# Patient Record
Sex: FEMALE | Race: WHITE | Employment: FULL TIME | URBAN - METROPOLITAN AREA
[De-identification: names, ages, dates, MRNs, and addresses within clinical notes are randomized per-mention and may not be internally consistent; named-entity substitution may affect disease eponyms.]

---

## 2017-01-19 ENCOUNTER — OFFICE VISIT (OUTPATIENT)
Dept: OTOLARYNGOLOGY | Facility: CLINIC | Age: 52
End: 2017-01-19

## 2017-01-19 VITALS
DIASTOLIC BLOOD PRESSURE: 76 MMHG | SYSTOLIC BLOOD PRESSURE: 108 MMHG | TEMPERATURE: 100 F | BODY MASS INDEX: 24.45 KG/M2 | HEIGHT: 63 IN | WEIGHT: 138 LBS

## 2017-01-19 DIAGNOSIS — J35.8 RETENTION CYST OF TONSIL: Primary | ICD-10-CM

## 2017-01-20 NOTE — PROGRESS NOTES
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 Patient presents with: Tonsil Problem      HISTORY OF PRESENT ILLNESS    She presents with a history of tonsil stone formation.  She recently saw developing some throat pain Stephon File on the left side and noted what irregular heartbeat/palpitations and syncope. GI Negative Abdominal pain and diarrhea. Endocrine Negative Cold intolerance and heat intolerance. Neuro Negative Tremors. Psych Negative Anxiety and depression.    Integumentary Negative Frequent skin i Inhalation Aerosol, Inhale 2 puffs into the lungs every 4 (four) hours as needed for Wheezing., Disp: 3 Inhaler, Rfl: 3  •  ergocalciferol 38637 UNITS Oral Cap, Take 1 capsule (50,000 Units total) by mouth once a week., Disp: 12 capsule, Rfl: 3  •  Citalop

## 2017-01-22 ENCOUNTER — TELEPHONE (OUTPATIENT)
Dept: INTERNAL MEDICINE CLINIC | Facility: CLINIC | Age: 52
End: 2017-01-22

## 2017-01-22 DIAGNOSIS — Z00.00 ROUTINE HEALTH MAINTENANCE: Primary | ICD-10-CM

## 2017-01-23 RX ORDER — TOPIRAMATE 25 MG/1
75 TABLET ORAL 2 TIMES DAILY
Qty: 180 TABLET | Refills: 0 | Status: SHIPPED | OUTPATIENT
Start: 2017-01-23 | End: 2017-06-08

## 2017-02-02 LAB
ABSOLUTE BASOPHILS: 19 CELLS/UL (ref 0–200)
ABSOLUTE EOSINOPHILS: 154 CELLS/UL (ref 15–500)
ABSOLUTE LYMPHOCYTES: 1773 CELLS/UL (ref 850–3900)
ABSOLUTE MONOCYTES: 435 CELLS/UL (ref 200–950)
ABSOLUTE NEUTROPHILS: 4019 CELLS/UL (ref 1500–7800)
BASOPHILS: 0.3 %
BUN: 13 MG/DL (ref 7–25)
CALCIUM: 9 MG/DL (ref 8.6–10.4)
CARBON DIOXIDE: 21 MMOL/L (ref 20–31)
CHLORIDE: 106 MMOL/L (ref 98–110)
CREATININE: 1.03 MG/DL (ref 0.5–1.05)
EGFR IF AFRICN AM: 72 ML/MIN/1.73M2
EGFR IF NONAFRICN AM: 62 ML/MIN/1.73M2
EOSINOPHILS: 2.4 %
GLUCOSE: 67 MG/DL (ref 65–99)
HEMATOCRIT: 43.1 % (ref 35–45)
HEMOGLOBIN: 14.3 G/DL (ref 11.7–15.5)
LYMPHOCYTES: 27.7 %
MCH: 28.2 PG (ref 27–33)
MCHC: 33.2 G/DL (ref 32–36)
MCV: 85.1 FL (ref 80–100)
MONOCYTES: 6.8 %
MPV: 9.1 FL (ref 7.5–12.5)
NEUTROPHILS: 62.8 %
PLATELET COUNT: 249 THOUSAND/UL (ref 140–400)
POTASSIUM: 3.8 MMOL/L (ref 3.5–5.3)
RDW: 12.9 % (ref 11–15)
RED BLOOD CELL COUNT: 5.06 MILLION/UL (ref 3.8–5.1)
SODIUM: 135 MMOL/L (ref 135–146)
T4, FREE: 1.4 NG/DL (ref 0.8–1.8)
TSH W/REFLEX TO FT4: 0.24 MIU/L
WHITE BLOOD CELL COUNT: 6.4 THOUSAND/UL (ref 3.8–10.8)

## 2017-02-15 PROCEDURE — 88175 CYTOPATH C/V AUTO FLUID REDO: CPT | Performed by: INTERNAL MEDICINE

## 2017-05-05 ENCOUNTER — TELEPHONE (OUTPATIENT)
Dept: INTERNAL MEDICINE CLINIC | Facility: CLINIC | Age: 52
End: 2017-05-05

## 2017-05-05 DIAGNOSIS — E03.9 ACQUIRED HYPOTHYROIDISM: Primary | ICD-10-CM

## 2017-06-08 ENCOUNTER — TELEPHONE (OUTPATIENT)
Dept: INTERNAL MEDICINE CLINIC | Facility: CLINIC | Age: 52
End: 2017-06-08

## 2017-06-08 RX ORDER — TOPIRAMATE 50 MG/1
50 TABLET, FILM COATED ORAL 2 TIMES DAILY
Qty: 180 TABLET | Refills: 3 | Status: SHIPPED | OUTPATIENT
Start: 2017-06-08 | End: 2017-10-07

## 2017-06-08 RX ORDER — LEVOTHYROXINE SODIUM 0.1 MG/1
100 TABLET ORAL DAILY
Qty: 90 TABLET | Refills: 3 | Status: SHIPPED | OUTPATIENT
Start: 2017-06-08 | End: 2017-10-07

## 2017-06-08 RX ORDER — TOPIRAMATE 25 MG/1
25 TABLET ORAL 2 TIMES DAILY
Qty: 180 TABLET | Refills: 3 | Status: SHIPPED | OUTPATIENT
Start: 2017-06-08 | End: 2017-09-04

## 2017-06-08 RX ORDER — ESTRADIOL 1 MG/1
1 TABLET ORAL DAILY
Qty: 90 TABLET | Refills: 3 | Status: SHIPPED | OUTPATIENT
Start: 2017-06-08 | End: 2017-11-13

## 2017-06-08 RX ORDER — METOPROLOL SUCCINATE 50 MG/1
50 TABLET, EXTENDED RELEASE ORAL DAILY
Qty: 90 TABLET | Refills: 3 | Status: SHIPPED | OUTPATIENT
Start: 2017-06-08 | End: 2017-08-14

## 2017-06-08 RX ORDER — MONTELUKAST SODIUM 10 MG/1
10 TABLET ORAL NIGHTLY
Qty: 90 TABLET | Refills: 3 | Status: SHIPPED | OUTPATIENT
Start: 2017-06-08 | End: 2017-08-14

## 2017-06-08 RX ORDER — MEDROXYPROGESTERONE ACETATE 5 MG/1
5 TABLET ORAL DAILY
Qty: 90 TABLET | Refills: 3 | Status: SHIPPED | OUTPATIENT
Start: 2017-06-08 | End: 2017-11-13

## 2017-06-08 RX ORDER — ERGOCALCIFEROL 1.25 MG/1
50000 CAPSULE ORAL WEEKLY
Qty: 12 CAPSULE | Refills: 3 | Status: SHIPPED | OUTPATIENT
Start: 2017-06-08 | End: 2017-09-04

## 2017-06-09 RX ORDER — CITALOPRAM 20 MG/1
TABLET ORAL
Qty: 90 TABLET | Refills: 3 | Status: SHIPPED | OUTPATIENT
Start: 2017-06-09 | End: 2017-12-06

## 2017-08-13 ENCOUNTER — TELEPHONE (OUTPATIENT)
Dept: INTERNAL MEDICINE CLINIC | Facility: CLINIC | Age: 52
End: 2017-08-13

## 2017-08-13 RX ORDER — RIZATRIPTAN BENZOATE 10 MG/1
10 TABLET ORAL AS NEEDED
Qty: 12 TABLET | Refills: 5 | Status: SHIPPED | OUTPATIENT
Start: 2017-08-13 | End: 2017-10-19

## 2017-08-13 RX ORDER — PREDNISOLONE ACETATE 10 MG/ML
2 SUSPENSION/ DROPS OPHTHALMIC 4 TIMES DAILY
Qty: 10 ML | Refills: 3 | Status: SHIPPED | OUTPATIENT
Start: 2017-08-13 | End: 2017-09-04

## 2017-08-14 RX ORDER — MONTELUKAST SODIUM 10 MG/1
10 TABLET ORAL NIGHTLY
Qty: 90 TABLET | Refills: 3 | Status: SHIPPED | OUTPATIENT
Start: 2017-08-14 | End: 2017-10-19

## 2017-08-14 RX ORDER — METOPROLOL SUCCINATE 50 MG/1
50 TABLET, EXTENDED RELEASE ORAL DAILY
Qty: 90 TABLET | Refills: 3 | Status: SHIPPED | OUTPATIENT
Start: 2017-08-14 | End: 2017-10-07

## 2017-08-14 RX ORDER — RANITIDINE 150 MG/1
150 TABLET ORAL 2 TIMES DAILY
Qty: 180 TABLET | Refills: 11 | Status: SHIPPED | OUTPATIENT
Start: 2017-08-14 | End: 2017-09-04

## 2017-09-06 ENCOUNTER — OFFICE VISIT (OUTPATIENT)
Dept: PODIATRY CLINIC | Facility: CLINIC | Age: 52
End: 2017-09-06

## 2017-09-06 VITALS — SYSTOLIC BLOOD PRESSURE: 116 MMHG | DIASTOLIC BLOOD PRESSURE: 64 MMHG | HEART RATE: 68 BPM | RESPIRATION RATE: 16 BRPM

## 2017-09-06 DIAGNOSIS — M20.5X1 HALLUX LIMITUS, RIGHT: Primary | ICD-10-CM

## 2017-09-06 PROCEDURE — 20600 DRAIN/INJ JOINT/BURSA W/O US: CPT | Performed by: PODIATRIST

## 2017-09-06 RX ORDER — METHYLPREDNISOLONE ACETATE 80 MG/ML
20 INJECTION, SUSPENSION INTRA-ARTICULAR; INTRALESIONAL; INTRAMUSCULAR; SOFT TISSUE ONCE
Status: COMPLETED | OUTPATIENT
Start: 2017-09-06 | End: 2017-09-06

## 2017-09-06 RX ADMIN — METHYLPREDNISOLONE ACETATE 20 MG: 80 INJECTION, SUSPENSION INTRA-ARTICULAR; INTRALESIONAL; INTRAMUSCULAR; SOFT TISSUE at 18:27:00

## 2017-09-06 NOTE — PROGRESS NOTES
Per verbal order from [facility], draw up 4ml of 1% lidocaine and 2ml of Kenalog 10 for cortisone injection to right foot big toe .  Modesta Montgomery

## 2017-09-06 NOTE — PROGRESS NOTES
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 HPI  This 79-year-old female presents for a desire to have a cortisone injection to the right great toe. I injected this approximately 1 year ago with relatively good but temporary relief. discussed reinjection of this joint. Patient signed a written consent. I injected the first metatarsal phalangeal joint of this right first metatarsophalangeal joint with Marcaine and Depo-Medrol 20 mg.   She was given postinjection instructions and will

## 2017-09-12 RX ORDER — METOPROLOL SUCCINATE 50 MG/1
50 TABLET, EXTENDED RELEASE ORAL DAILY
Qty: 90 TABLET | Refills: 3 | Status: SHIPPED | OUTPATIENT
Start: 2017-09-12 | End: 2018-08-24

## 2017-10-07 RX ORDER — LEVOTHYROXINE SODIUM 0.1 MG/1
100 TABLET ORAL DAILY
Qty: 90 TABLET | Refills: 3 | Status: SHIPPED | OUTPATIENT
Start: 2017-10-07 | End: 2017-10-19

## 2017-10-07 RX ORDER — RANITIDINE 150 MG/1
150 TABLET ORAL 2 TIMES DAILY
Qty: 180 TABLET | Refills: 11 | Status: SHIPPED | OUTPATIENT
Start: 2017-10-07 | End: 2017-11-13

## 2017-10-07 RX ORDER — TOPIRAMATE 50 MG/1
50 TABLET, FILM COATED ORAL 2 TIMES DAILY
Qty: 180 TABLET | Refills: 3 | Status: SHIPPED | OUTPATIENT
Start: 2017-10-07 | End: 2018-08-10

## 2017-10-11 ENCOUNTER — MED REC SCAN ONLY (OUTPATIENT)
Dept: INTERNAL MEDICINE CLINIC | Facility: CLINIC | Age: 52
End: 2017-10-11

## 2017-10-19 ENCOUNTER — TELEPHONE (OUTPATIENT)
Dept: INTERNAL MEDICINE CLINIC | Facility: CLINIC | Age: 52
End: 2017-10-19

## 2017-10-19 DIAGNOSIS — Z12.31 VISIT FOR SCREENING MAMMOGRAM: Primary | ICD-10-CM

## 2017-10-19 RX ORDER — TERBINAFINE HYDROCHLORIDE 250 MG/1
250 TABLET ORAL DAILY
Qty: 90 TABLET | Refills: 1 | Status: SHIPPED | OUTPATIENT
Start: 2017-10-19 | End: 2017-10-30

## 2017-10-19 RX ORDER — MONTELUKAST SODIUM 10 MG/1
10 TABLET ORAL NIGHTLY
Qty: 90 TABLET | Refills: 3 | Status: SHIPPED | OUTPATIENT
Start: 2017-10-19 | End: 2019-01-31

## 2017-10-19 RX ORDER — LEVOTHYROXINE SODIUM 0.1 MG/1
100 TABLET ORAL DAILY
Qty: 90 TABLET | Refills: 3 | Status: SHIPPED | OUTPATIENT
Start: 2017-10-19 | End: 2017-10-30

## 2017-10-19 RX ORDER — RIZATRIPTAN BENZOATE 10 MG/1
10 TABLET ORAL AS NEEDED
Qty: 12 TABLET | Refills: 5 | Status: SHIPPED | OUTPATIENT
Start: 2017-10-19 | End: 2018-09-25

## 2017-10-30 RX ORDER — LEVOTHYROXINE SODIUM 0.1 MG/1
100 TABLET ORAL DAILY
Qty: 90 TABLET | Refills: 3 | Status: SHIPPED | OUTPATIENT
Start: 2017-10-30 | End: 2018-08-04

## 2017-10-30 RX ORDER — TERBINAFINE HYDROCHLORIDE 250 MG/1
250 TABLET ORAL DAILY
Qty: 90 TABLET | Refills: 1 | Status: SHIPPED | OUTPATIENT
Start: 2017-10-30 | End: 2017-11-29

## 2017-11-04 RX ORDER — LEVALBUTEROL TARTRATE 45 UG/1
2 AEROSOL, METERED ORAL EVERY 4 HOURS PRN
Qty: 3 INHALER | Refills: 3 | Status: SHIPPED | OUTPATIENT
Start: 2017-11-04 | End: 2019-01-31

## 2017-11-13 RX ORDER — TOPIRAMATE 25 MG/1
25 TABLET ORAL 2 TIMES DAILY
Qty: 180 TABLET | Refills: 3 | Status: SHIPPED | OUTPATIENT
Start: 2017-11-13 | End: 2017-11-29

## 2017-11-13 RX ORDER — ESTRADIOL 1 MG/1
1 TABLET ORAL DAILY
Qty: 90 TABLET | Refills: 3 | Status: SHIPPED | OUTPATIENT
Start: 2017-11-13 | End: 2018-10-28

## 2017-11-13 RX ORDER — MEDROXYPROGESTERONE ACETATE 5 MG/1
5 TABLET ORAL DAILY
Qty: 90 TABLET | Refills: 3 | Status: SHIPPED | OUTPATIENT
Start: 2017-11-13 | End: 2018-10-28

## 2017-11-29 RX ORDER — CELECOXIB 200 MG/1
200 CAPSULE ORAL 2 TIMES DAILY
Qty: 60 CAPSULE | Refills: 1 | Status: SHIPPED | OUTPATIENT
Start: 2017-11-29 | End: 2017-12-05

## 2017-12-10 ENCOUNTER — TELEPHONE (OUTPATIENT)
Dept: INTERNAL MEDICINE CLINIC | Facility: CLINIC | Age: 52
End: 2017-12-10

## 2017-12-10 DIAGNOSIS — E03.9 HYPOTHYROIDISM, UNSPECIFIED TYPE: Primary | ICD-10-CM

## 2017-12-10 DIAGNOSIS — Z00.00 HEALTH CARE MAINTENANCE: ICD-10-CM

## 2017-12-13 LAB
ALBUMIN/GLOBULIN RATIO: 1.5 (CALC) (ref 1–2.5)
ALBUMIN: 4.1 G/DL (ref 3.6–5.1)
ALKALINE PHOSPHATASE: 65 U/L (ref 33–130)
ALT: 18 U/L (ref 6–29)
AST: 17 U/L (ref 10–35)
BILIRUBIN, TOTAL: 0.5 MG/DL (ref 0.2–1.2)
BUN: 17 MG/DL (ref 7–25)
CALCIUM: 8.8 MG/DL (ref 8.6–10.4)
CARBON DIOXIDE: 20 MMOL/L (ref 20–31)
CHLORIDE: 108 MMOL/L (ref 98–110)
CREATININE: 0.98 MG/DL (ref 0.5–1.05)
EGFR IF AFRICN AM: 76 ML/MIN/1.73M2
EGFR IF NONAFRICN AM: 66 ML/MIN/1.73M2
GLOBULIN: 2.7 G/DL (CALC) (ref 1.9–3.7)
GLUCOSE: 75 MG/DL (ref 65–99)
POTASSIUM: 4.1 MMOL/L (ref 3.5–5.3)
PROTEIN, TOTAL: 6.8 G/DL (ref 6.1–8.1)
SODIUM: 137 MMOL/L (ref 135–146)
TSH W/REFLEX TO FT4: 0.55 MIU/L
VITAMIN D, 25-OH, TOTAL: 46 NG/ML (ref 30–100)

## 2017-12-24 RX ORDER — CLOBETASOL PROPIONATE 0.5 MG/G
CREAM TOPICAL
Qty: 45 G | Refills: 0 | Status: SHIPPED | OUTPATIENT
Start: 2017-12-24 | End: 2017-12-25

## 2017-12-25 RX ORDER — CLOBETASOL PROPIONATE 0.5 MG/G
CREAM TOPICAL
Qty: 45 G | Refills: 0 | Status: SHIPPED | OUTPATIENT
Start: 2017-12-25 | End: 2018-05-23

## 2017-12-26 RX ORDER — CLOBETASOL PROPIONATE 0.5 MG/G
CREAM TOPICAL
Qty: 45 G | Refills: 0 | OUTPATIENT
Start: 2017-12-26

## 2017-12-26 NOTE — TELEPHONE ENCOUNTER
[address] [facility], [facility] [address] , [phone], [phone]   Medication Detail      Disp Refills Start End    Clobetasol Propionate 0.05 % External Cream 45 g 0 12/25/2017     Sig: Apply bid.     E

## 2018-01-20 ENCOUNTER — HOSPITAL ENCOUNTER (OUTPATIENT)
Dept: MAMMOGRAPHY | Age: 53
Discharge: HOME | End: 2018-01-20
Attending: INTERNAL MEDICINE
Payer: COMMERCIAL

## 2018-01-20 DIAGNOSIS — Z12.31 VISIT FOR SCREENING MAMMOGRAM: ICD-10-CM

## 2018-01-20 PROCEDURE — 77063 BREAST TOMOSYNTHESIS BI: CPT | Performed by: INTERNAL MEDICINE

## 2018-01-20 PROCEDURE — 77067 SCR MAMMO BI INCL CAD: CPT | Performed by: INTERNAL MEDICINE

## 2018-01-31 ENCOUNTER — TELEPHONE (OUTPATIENT)
Dept: INTERNAL MEDICINE CLINIC | Facility: CLINIC | Age: 53
End: 2018-01-31

## 2018-01-31 DIAGNOSIS — M25.511 ACUTE PAIN OF RIGHT SHOULDER: Primary | ICD-10-CM

## 2018-01-31 DIAGNOSIS — M54.9 UPPER BACK PAIN: ICD-10-CM

## 2018-01-31 NOTE — TELEPHONE ENCOUNTER
Talked to patient and she is having right shoulder pain and upper back pain  -will send to PT - will have her come in if pain increases or worsens.

## 2018-05-01 ENCOUNTER — TELEPHONE (OUTPATIENT)
Dept: INTERNAL MEDICINE CLINIC | Facility: CLINIC | Age: 53
End: 2018-05-01

## 2018-05-01 DIAGNOSIS — R05.9 COUGH: Primary | ICD-10-CM

## 2018-05-04 ENCOUNTER — HOSPITAL ENCOUNTER (OUTPATIENT)
Dept: RESPIRATORY THERAPY | Facility: HOSPITAL | Age: 53
Discharge: HOME | End: 2018-05-04
Attending: INTERNAL MEDICINE
Payer: COMMERCIAL

## 2018-05-04 DIAGNOSIS — R05.9 COUGH: ICD-10-CM

## 2018-05-04 PROCEDURE — 94010 BREATHING CAPACITY TEST: CPT | Performed by: INTERNAL MEDICINE

## 2018-05-04 NOTE — PROCEDURES
[facility]    Patient's Name Radha Patel MD MRN [id number]    1965 Pulmonologist Umair Jordan MD   Location [facility] PCP Selestino Riedel, MD     IMPRESSION:    The spirometry is Normal.    N

## 2018-05-16 ENCOUNTER — OFFICE VISIT (OUTPATIENT)
Dept: PODIATRY CLINIC | Facility: CLINIC | Age: 53
End: 2018-05-16

## 2018-05-16 VITALS — SYSTOLIC BLOOD PRESSURE: 98 MMHG | DIASTOLIC BLOOD PRESSURE: 67 MMHG | RESPIRATION RATE: 14 BRPM | HEART RATE: 65 BPM

## 2018-05-16 DIAGNOSIS — M20.5X1 HALLUX LIMITUS, RIGHT: Primary | ICD-10-CM

## 2018-05-16 PROCEDURE — 20600 DRAIN/INJ JOINT/BURSA W/O US: CPT | Performed by: PODIATRIST

## 2018-05-16 NOTE — PROGRESS NOTES
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 HPI  This 18-year-old female presents with recurrent pain associated with the right great toe.   Saw this patient last September and gave her an injection of cortisone in the first metatarsoph Known Allergies   PHYSICAL EXAM:   Physical Exam  Physical exam pedal pulses are noted. There is no evidence of edema nor erythema. There is palpable discomfort at the first metatarsal phalangeal joint with limited motion.   Clearly the changes are consis

## 2018-05-16 NOTE — PROGRESS NOTES
Per verbal order from Dr. Maria Ines Harding, draw up 0.5ml of 0.5% Marcaine and 20 mg of Depo-Medrol for cortisone injection to right big toe. Modesta Montgomery    Patient left before post injection vitals were taken. Modesta Montgomery

## 2018-05-19 ENCOUNTER — HOSPITAL ENCOUNTER (OUTPATIENT)
Dept: GENERAL RADIOLOGY | Age: 53
Discharge: HOME | End: 2018-05-19
Attending: INTERNAL MEDICINE
Payer: COMMERCIAL

## 2018-05-19 ENCOUNTER — TELEPHONE (OUTPATIENT)
Dept: INTERNAL MEDICINE CLINIC | Facility: CLINIC | Age: 53
End: 2018-05-19

## 2018-05-19 DIAGNOSIS — R05.9 COUGH: ICD-10-CM

## 2018-05-19 DIAGNOSIS — R05.9 COUGH: Primary | ICD-10-CM

## 2018-05-19 PROCEDURE — 71046 X-RAY EXAM CHEST 2 VIEWS: CPT | Performed by: INTERNAL MEDICINE

## 2018-07-01 ENCOUNTER — TELEPHONE (OUTPATIENT)
Dept: INTERNAL MEDICINE CLINIC | Facility: CLINIC | Age: 53
End: 2018-07-01

## 2018-07-01 RX ORDER — TOPIRAMATE 25 MG/1
25 TABLET ORAL 2 TIMES DAILY
Qty: 180 TABLET | Refills: 3 | Status: SHIPPED | OUTPATIENT
Start: 2018-07-01 | End: 2019-01-31

## 2018-08-04 RX ORDER — LEVOTHYROXINE SODIUM 0.1 MG/1
TABLET ORAL
Qty: 90 TABLET | Refills: 0 | Status: SHIPPED | OUTPATIENT
Start: 2018-08-04 | End: 2019-01-31

## 2018-08-04 NOTE — TELEPHONE ENCOUNTER
Refill passed per [facility] protocol.   Hypothyroid Medications  Protocol Criteria:  Appointment scheduled in the past 12 months or the next 3 months  TSH resulted in the past 12 months that is normal  Recent Outpatient Visits            2 months ago

## 2018-08-10 RX ORDER — TOPIRAMATE 50 MG/1
50 TABLET, FILM COATED ORAL 2 TIMES DAILY
Qty: 180 TABLET | Refills: 3 | Status: SHIPPED | OUTPATIENT
Start: 2018-08-10 | End: 2019-06-30

## 2018-09-20 ENCOUNTER — MED REC SCAN ONLY (OUTPATIENT)
Dept: INTERNAL MEDICINE CLINIC | Facility: CLINIC | Age: 53
End: 2018-09-20

## 2018-09-20 ENCOUNTER — TELEPHONE (OUTPATIENT)
Dept: INTERNAL MEDICINE CLINIC | Facility: CLINIC | Age: 53
End: 2018-09-20

## 2018-09-20 DIAGNOSIS — Z23 NEED FOR VACCINATION: Primary | ICD-10-CM

## 2018-09-20 PROCEDURE — 90686 IIV4 VACC NO PRSV 0.5 ML IM: CPT | Performed by: INTERNAL MEDICINE

## 2018-09-20 PROCEDURE — 90471 IMMUNIZATION ADMIN: CPT | Performed by: INTERNAL MEDICINE

## 2018-09-25 ENCOUNTER — TELEPHONE (OUTPATIENT)
Dept: INTERNAL MEDICINE CLINIC | Facility: CLINIC | Age: 53
End: 2018-09-25

## 2018-09-25 RX ORDER — FLUCONAZOLE 100 MG/1
100 TABLET ORAL DAILY
Qty: 30 TABLET | Refills: 0 | Status: SHIPPED | OUTPATIENT
Start: 2018-09-25 | End: 2018-10-25

## 2018-09-25 RX ORDER — RIZATRIPTAN BENZOATE 10 MG/1
10 TABLET ORAL AS NEEDED
Qty: 12 TABLET | Refills: 5 | Status: SHIPPED | OUTPATIENT
Start: 2018-09-25 | End: 2019-05-21

## 2018-09-25 NOTE — TELEPHONE ENCOUNTER
Nathalia calling to advise a drug interaction with meds below. •  fluconazole 100 MG Oral Tab, Take 1 tablet (100 mg total) by mouth daily. , Disp: 30 tablet, Rfl: 0  •  Citalopram Hydrobromide 20 MG Oral Tab, Take 1 tablet (20 mg total) by mout

## 2018-09-25 NOTE — TELEPHONE ENCOUNTER
Spoke with [facility]--states drug interaction of two medications below is QT prolongation--asking if ok to fill.

## 2018-10-28 RX ORDER — ESTRADIOL 1 MG/1
1 TABLET ORAL DAILY
Qty: 90 TABLET | Refills: 3 | Status: SHIPPED | OUTPATIENT
Start: 2018-10-28 | End: 2019-09-25

## 2018-10-28 RX ORDER — MEDROXYPROGESTERONE ACETATE 5 MG/1
5 TABLET ORAL DAILY
Qty: 90 TABLET | Refills: 3 | Status: SHIPPED | OUTPATIENT
Start: 2018-10-28 | End: 2019-09-25

## 2018-11-07 ENCOUNTER — OFFICE VISIT (OUTPATIENT)
Dept: PODIATRY CLINIC | Facility: CLINIC | Age: 53
End: 2018-11-07
Payer: COMMERCIAL

## 2018-11-07 VITALS — SYSTOLIC BLOOD PRESSURE: 110 MMHG | TEMPERATURE: 97 F | DIASTOLIC BLOOD PRESSURE: 77 MMHG

## 2018-11-07 DIAGNOSIS — M20.5X1 HALLUX LIMITUS, RIGHT: Primary | ICD-10-CM

## 2018-11-07 PROCEDURE — 20600 DRAIN/INJ JOINT/BURSA W/O US: CPT | Performed by: PODIATRIST

## 2018-11-07 RX ORDER — ACETAMINOPHEN AND CODEINE PHOSPHATE 300; 30 MG/1; MG/1
TABLET ORAL
Refills: 0 | COMMUNITY
Start: 2018-10-24 | End: 2019-01-02

## 2018-11-07 NOTE — PROGRESS NOTES
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 This 50-year-old female presents with recurrent pain associated with the first metatarsophalangeal joint of the right foot. This pain has been relieved by previous cortisone injection.   Elsie Known Allergies   PHYSICAL EXAM:     On physical exam pedal pulses are noted there is no edema nor erythema. There is palpable discomfort with some limitation of motion of the MPJ there is no palpable crepitation.   I agreed to reinject this joint and afte

## 2018-12-22 ENCOUNTER — TELEPHONE (OUTPATIENT)
Dept: INTERNAL MEDICINE CLINIC | Facility: CLINIC | Age: 53
End: 2018-12-22

## 2018-12-22 RX ORDER — PANTOPRAZOLE SODIUM 40 MG/1
40 TABLET, DELAYED RELEASE ORAL
Qty: 90 TABLET | Refills: 3 | Status: SHIPPED | OUTPATIENT
Start: 2018-12-22 | End: 2019-01-31

## 2018-12-22 RX ORDER — CELECOXIB 200 MG/1
200 CAPSULE ORAL 2 TIMES DAILY
Qty: 180 CAPSULE | Refills: 3 | Status: SHIPPED | OUTPATIENT
Start: 2018-12-22 | End: 2019-01-31

## 2018-12-28 RX ORDER — TERBINAFINE HYDROCHLORIDE 250 MG/1
250 TABLET ORAL DAILY
Qty: 90 TABLET | Refills: 1 | Status: SHIPPED | OUTPATIENT
Start: 2018-12-28 | End: 2019-01-31

## 2019-01-04 ENCOUNTER — TELEPHONE (OUTPATIENT)
Dept: INTERNAL MEDICINE CLINIC | Facility: CLINIC | Age: 54
End: 2019-01-04

## 2019-01-04 DIAGNOSIS — Z00.00 HEALTHCARE MAINTENANCE: Primary | ICD-10-CM

## 2019-01-08 LAB
ABSOLUTE BASOPHILS: 39 CELLS/UL (ref 0–200)
ABSOLUTE EOSINOPHILS: 253 CELLS/UL (ref 15–500)
ABSOLUTE LYMPHOCYTES: 1727 CELLS/UL (ref 850–3900)
ABSOLUTE MONOCYTES: 363 CELLS/UL (ref 200–950)
ABSOLUTE NEUTROPHILS: 3119 CELLS/UL (ref 1500–7800)
ALBUMIN/GLOBULIN RATIO: 1.5 (CALC) (ref 1–2.5)
ALBUMIN: 4 G/DL (ref 3.6–5.1)
ALKALINE PHOSPHATASE: 56 U/L (ref 33–130)
ALT: 11 U/L (ref 6–29)
AST: 16 U/L (ref 10–35)
BASOPHILS: 0.7 %
BILIRUBIN, TOTAL: 0.7 MG/DL (ref 0.2–1.2)
BUN: 15 MG/DL (ref 7–25)
CALCIUM: 9 MG/DL (ref 8.6–10.4)
CARBON DIOXIDE: 26 MMOL/L (ref 20–32)
CHLORIDE: 107 MMOL/L (ref 98–110)
CHOL/HDLC RATIO: 2.6 (CALC)
CHOLESTEROL, TOTAL: 185 MG/DL
CREATININE: 0.91 MG/DL (ref 0.5–1.05)
EGFR IF AFRICN AM: 83 ML/MIN/1.73M2
EGFR IF NONAFRICN AM: 72 ML/MIN/1.73M2
EOSINOPHILS: 4.6 %
GLOBULIN: 2.7 G/DL (CALC) (ref 1.9–3.7)
GLUCOSE: 80 MG/DL (ref 65–99)
HDL CHOLESTEROL: 70 MG/DL
HEMATOCRIT: 39 % (ref 35–45)
HEMOGLOBIN: 13.1 G/DL (ref 11.7–15.5)
LDL-CHOLESTEROL: 98 MG/DL (CALC)
LYMPHOCYTES: 31.4 %
MCH: 28.5 PG (ref 27–33)
MCHC: 33.6 G/DL (ref 32–36)
MCV: 84.8 FL (ref 80–100)
MONOCYTES: 6.6 %
MPV: 10.8 FL (ref 7.5–12.5)
NEUTROPHILS: 56.7 %
NON-HDL CHOLESTEROL: 115 MG/DL (CALC)
PLATELET COUNT: 230 THOUSAND/UL (ref 140–400)
POTASSIUM: 4 MMOL/L (ref 3.5–5.3)
PROTEIN, TOTAL: 6.7 G/DL (ref 6.1–8.1)
RDW: 12.3 % (ref 11–15)
RED BLOOD CELL COUNT: 4.6 MILLION/UL (ref 3.8–5.1)
SODIUM: 138 MMOL/L (ref 135–146)
T4, FREE: 1.6 NG/DL (ref 0.8–1.8)
TRIGLYCERIDES: 77 MG/DL
TSH W/REFLEX TO FT4: 0.17 MIU/L
WHITE BLOOD CELL COUNT: 5.5 THOUSAND/UL (ref 3.8–10.8)

## 2019-01-31 ENCOUNTER — TELEPHONE (OUTPATIENT)
Dept: INTERNAL MEDICINE CLINIC | Facility: CLINIC | Age: 54
End: 2019-01-31

## 2019-01-31 RX ORDER — LEVOTHYROXINE SODIUM 88 UG/1
TABLET ORAL
Qty: 90 TABLET | Refills: 3 | Status: SHIPPED | OUTPATIENT
Start: 2019-01-31 | End: 2019-10-26

## 2019-02-15 ENCOUNTER — TELEPHONE (OUTPATIENT)
Dept: INTERNAL MEDICINE CLINIC | Facility: CLINIC | Age: 54
End: 2019-02-15

## 2019-02-15 DIAGNOSIS — Z00.00 HEALTHCARE MAINTENANCE: ICD-10-CM

## 2019-02-15 DIAGNOSIS — M85.89 OSTEOPENIA OF MULTIPLE SITES: Primary | ICD-10-CM

## 2019-02-15 RX ORDER — TRIAMCINOLONE ACETONIDE 0.25 MG/G
1 OINTMENT TOPICAL 3 TIMES DAILY
Qty: 80 G | Refills: 1 | Status: SHIPPED | OUTPATIENT
Start: 2019-02-15 | End: 2019-05-21

## 2019-02-22 ENCOUNTER — HOSPITAL ENCOUNTER (OUTPATIENT)
Dept: BONE DENSITY | Age: 54
Discharge: HOME | End: 2019-02-22
Attending: INTERNAL MEDICINE
Payer: COMMERCIAL

## 2019-02-22 ENCOUNTER — HOSPITAL ENCOUNTER (OUTPATIENT)
Dept: MAMMOGRAPHY | Age: 54
Discharge: HOME | End: 2019-02-22
Attending: INTERNAL MEDICINE
Payer: COMMERCIAL

## 2019-02-22 DIAGNOSIS — Z12.31 VISIT FOR SCREENING MAMMOGRAM: ICD-10-CM

## 2019-02-22 DIAGNOSIS — Z00.00 HEALTHCARE MAINTENANCE: ICD-10-CM

## 2019-02-22 DIAGNOSIS — M85.89 OSTEOPENIA OF MULTIPLE SITES: ICD-10-CM

## 2019-02-22 PROCEDURE — 77080 DXA BONE DENSITY AXIAL: CPT | Performed by: INTERNAL MEDICINE

## 2019-02-22 PROCEDURE — 77067 SCR MAMMO BI INCL CAD: CPT | Performed by: INTERNAL MEDICINE

## 2019-02-22 PROCEDURE — 77063 BREAST TOMOSYNTHESIS BI: CPT | Performed by: INTERNAL MEDICINE

## 2019-03-13 ENCOUNTER — OFFICE VISIT (OUTPATIENT)
Dept: PODIATRY CLINIC | Facility: CLINIC | Age: 54
End: 2019-03-13
Payer: COMMERCIAL

## 2019-03-13 DIAGNOSIS — M20.5X1 HALLUX LIMITUS, RIGHT: Primary | ICD-10-CM

## 2019-03-13 PROCEDURE — 20600 DRAIN/INJ JOINT/BURSA W/O US: CPT | Performed by: PODIATRIST

## 2019-03-13 RX ORDER — METHYLPREDNISOLONE ACETATE 80 MG/ML
80 INJECTION, SUSPENSION INTRA-ARTICULAR; INTRALESIONAL; INTRAMUSCULAR; SOFT TISSUE ONCE
Status: DISCONTINUED | OUTPATIENT
Start: 2019-03-13 | End: 2019-05-21

## 2019-03-13 NOTE — PROGRESS NOTES
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 62-year-old female presents with recurrent pain associated with the great toe. She has a vacation planned in the coming week and asked that I reinject the joint.   When I inject this katie given postinjection instructions.   She is well-informed and will follow-up as needed         ASSESSMENT/PLAN:   Hallux limitus, right  (primary encounter diagnosis)    Orders Placed This Encounter      arthrocentesis small joing      Meds This Visit:  Requ

## 2019-04-05 ENCOUNTER — HOSPITAL ENCOUNTER (OUTPATIENT)
Facility: HOSPITAL | Age: 54
Setting detail: HOSPITAL OUTPATIENT SURGERY
Discharge: HOME | End: 2019-04-05
Attending: INTERNAL MEDICINE | Admitting: INTERNAL MEDICINE
Payer: COMMERCIAL

## 2019-04-05 DIAGNOSIS — Z12.11 SPECIAL SCREENING FOR MALIGNANT NEOPLASMS, COLON: ICD-10-CM

## 2019-04-05 DIAGNOSIS — Z12.11 COLON CANCER SCREENING: ICD-10-CM

## 2019-04-05 PROCEDURE — 0DJD8ZZ INSPECTION OF LOWER INTESTINAL TRACT, VIA NATURAL OR ARTIFICIAL OPENING ENDOSCOPIC: ICD-10-PCS | Performed by: INTERNAL MEDICINE

## 2019-04-05 PROCEDURE — 99152 MOD SED SAME PHYS/QHP 5/>YRS: CPT | Performed by: INTERNAL MEDICINE

## 2019-04-05 RX ORDER — SODIUM CHLORIDE 0.9 % (FLUSH) 0.9 %
10 SYRINGE (ML) INJECTION AS NEEDED
Status: DISCONTINUED | OUTPATIENT
Start: 2019-04-05 | End: 2019-04-05

## 2019-04-05 RX ORDER — MIDAZOLAM HYDROCHLORIDE 1 MG/ML
INJECTION INTRAMUSCULAR; INTRAVENOUS
Status: DISCONTINUED | OUTPATIENT
Start: 2019-04-05 | End: 2019-04-05

## 2019-04-05 RX ORDER — MIDAZOLAM HYDROCHLORIDE 1 MG/ML
1 INJECTION INTRAMUSCULAR; INTRAVENOUS EVERY 5 MIN PRN
Status: DISCONTINUED | OUTPATIENT
Start: 2019-04-05 | End: 2019-04-05

## 2019-04-05 RX ORDER — SODIUM CHLORIDE, SODIUM LACTATE, POTASSIUM CHLORIDE, CALCIUM CHLORIDE 600; 310; 30; 20 MG/100ML; MG/100ML; MG/100ML; MG/100ML
INJECTION, SOLUTION INTRAVENOUS CONTINUOUS
Status: CANCELLED | OUTPATIENT
Start: 2019-04-05

## 2019-04-05 RX ORDER — SODIUM CHLORIDE 0.9 % (FLUSH) 0.9 %
10 SYRINGE (ML) INJECTION AS NEEDED
Status: CANCELLED | OUTPATIENT
Start: 2019-04-05

## 2019-04-05 RX ORDER — SODIUM CHLORIDE, SODIUM LACTATE, POTASSIUM CHLORIDE, CALCIUM CHLORIDE 600; 310; 30; 20 MG/100ML; MG/100ML; MG/100ML; MG/100ML
INJECTION, SOLUTION INTRAVENOUS CONTINUOUS
Status: DISCONTINUED | OUTPATIENT
Start: 2019-04-05 | End: 2019-04-05

## 2019-04-05 NOTE — OPERATIVE REPORT
COLONOSCOPY REPORT    Patient Name:  Fei Poon Record #: [id number]  YOB: 1965  Date of Procedure: 4/5/2019    Referring physician: Juan F Cueto MD    Surgeon:  Razia Urbina.  Wade Marx MD    Pre-op diagnosis: Colon canc

## 2019-04-05 NOTE — H&P
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 7/7/1965. Patient presents for a colonoscopy. ALLERGIES: No Known Allergies      No current outpatient medications on file.   Past Medical History:   Diagnosis Date   • Anxie

## 2019-04-06 VITALS
RESPIRATION RATE: 12 BRPM | HEIGHT: 63 IN | OXYGEN SATURATION: 100 % | WEIGHT: 127 LBS | SYSTOLIC BLOOD PRESSURE: 111 MMHG | DIASTOLIC BLOOD PRESSURE: 74 MMHG | HEART RATE: 76 BPM | BODY MASS INDEX: 22.5 KG/M2

## 2019-05-21 ENCOUNTER — TELEPHONE (OUTPATIENT)
Dept: INTERNAL MEDICINE CLINIC | Facility: CLINIC | Age: 54
End: 2019-05-21

## 2019-05-21 DIAGNOSIS — E03.9 HYPOTHYROIDISM, UNSPECIFIED TYPE: Primary | ICD-10-CM

## 2019-05-21 RX ORDER — RIZATRIPTAN BENZOATE 10 MG/1
10 TABLET ORAL AS NEEDED
Qty: 12 TABLET | Refills: 5 | Status: SHIPPED | OUTPATIENT
Start: 2019-05-21 | End: 2019-08-01

## 2019-05-31 ENCOUNTER — MOBILE ENCOUNTER (OUTPATIENT)
Dept: INTERNAL MEDICINE CLINIC | Facility: CLINIC | Age: 54
End: 2019-05-31

## 2019-06-11 ENCOUNTER — TELEPHONE (OUTPATIENT)
Dept: INTERNAL MEDICINE CLINIC | Facility: CLINIC | Age: 54
End: 2019-06-11

## 2019-06-11 ENCOUNTER — HOSPITAL ENCOUNTER (OUTPATIENT)
Dept: GENERAL RADIOLOGY | Facility: HOSPITAL | Age: 54
Discharge: HOME | End: 2019-06-11
Attending: INTERNAL MEDICINE
Payer: COMMERCIAL

## 2019-06-11 DIAGNOSIS — E03.9 ACQUIRED HYPOTHYROIDISM: ICD-10-CM

## 2019-06-11 DIAGNOSIS — M79.671 RIGHT FOOT PAIN: ICD-10-CM

## 2019-06-11 DIAGNOSIS — M79.671 RIGHT FOOT PAIN: Primary | ICD-10-CM

## 2019-06-11 PROCEDURE — 73630 X-RAY EXAM OF FOOT: CPT | Performed by: INTERNAL MEDICINE

## 2019-06-17 ENCOUNTER — OFFICE VISIT (OUTPATIENT)
Dept: PODIATRY CLINIC | Facility: CLINIC | Age: 54
End: 2019-06-17
Payer: COMMERCIAL

## 2019-06-17 VITALS — SYSTOLIC BLOOD PRESSURE: 106 MMHG | DIASTOLIC BLOOD PRESSURE: 69 MMHG | RESPIRATION RATE: 16 BRPM | HEART RATE: 81 BPM

## 2019-06-17 DIAGNOSIS — M20.5X1 HALLUX LIMITUS, RIGHT: Primary | ICD-10-CM

## 2019-06-17 DIAGNOSIS — B35.1 ONYCHOMYCOSIS: ICD-10-CM

## 2019-06-17 DIAGNOSIS — M77.50 CAPSULITIS OF METATARSOPHALANGEAL (MTP) JOINT: ICD-10-CM

## 2019-06-17 NOTE — PROGRESS NOTES
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 Patient presents with:   Foot Pain: Right - was seen by [facility] on 3/13/19 when she had a cortisone inj in R 1st toe - pain restarted about 2 mo after and now she has pain under her R 2nd toe also - rates pain as 3-5 5/28/2014   • Irritable bowel syndrome with diarrhea 2/3/2016   • Migraines 5/28/2014   • Osteopenia 5/28/2014      Past Surgical History:   Procedure Laterality Date   • COLONOSCOPY  02/2009    normal, done at [address] [place] [address]   • COLONOSCOPY  04/2019    normal   • COL especially the third and the right are thickened yellowed and dystrophic with subungual debris and distal lysis from the nailbed she indicates that she is tried a couple of courses of Lamisil tablet therapy as well as topicals with no success.   A biopsy fo understanding of these issues and agrees to the plan. Return in about 2 weeks (around 7/1/2019).     Marcos Daniel DPM  6/17/2019

## 2019-07-01 RX ORDER — TOPIRAMATE 50 MG/1
TABLET, FILM COATED ORAL
Qty: 180 TABLET | Refills: 3 | Status: SHIPPED | OUTPATIENT
Start: 2019-07-01 | End: 2020-03-04

## 2019-07-08 ENCOUNTER — OFFICE VISIT (OUTPATIENT)
Dept: PODIATRY CLINIC | Facility: CLINIC | Age: 54
End: 2019-07-08
Payer: COMMERCIAL

## 2019-07-08 VITALS — DIASTOLIC BLOOD PRESSURE: 75 MMHG | SYSTOLIC BLOOD PRESSURE: 109 MMHG | RESPIRATION RATE: 14 BRPM | HEART RATE: 81 BPM

## 2019-07-08 DIAGNOSIS — M20.5X1 HALLUX LIMITUS, RIGHT: Primary | ICD-10-CM

## 2019-07-08 DIAGNOSIS — M77.50 CAPSULITIS OF METATARSOPHALANGEAL (MTP) JOINT: ICD-10-CM

## 2019-07-08 PROCEDURE — 20600 DRAIN/INJ JOINT/BURSA W/O US: CPT | Performed by: PODIATRIST

## 2019-07-08 RX ORDER — TRIAMCINOLONE ACETONIDE 40 MG/ML
20 INJECTION, SUSPENSION INTRA-ARTICULAR; INTRAMUSCULAR ONCE
Status: COMPLETED | OUTPATIENT
Start: 2019-07-08 | End: 2019-07-08

## 2019-07-08 RX ORDER — ACETAMINOPHEN AND CODEINE PHOSPHATE 300; 30 MG/1; MG/1
TABLET ORAL
Refills: 1 | COMMUNITY
Start: 2019-06-16 | End: 2019-09-25

## 2019-07-08 RX ADMIN — TRIAMCINOLONE ACETONIDE 20 MG: 40 INJECTION, SUSPENSION INTRA-ARTICULAR; INTRAMUSCULAR at 09:14:00

## 2019-07-08 NOTE — PROGRESS NOTES
Per Dr Maggi Mantilla, draw up two syringes 0.5mL of 0.5% Marcaine and 0.5mL of Kenalog 40 for injection to right foot.  KP

## 2019-07-16 ENCOUNTER — TELEPHONE (OUTPATIENT)
Dept: ORTHOPEDICS CLINIC | Facility: CLINIC | Age: 54
End: 2019-07-16

## 2019-07-16 NOTE — TELEPHONE ENCOUNTER
S/w pt. appt made for 0920. Pt is requesting injection, states it was clear in message to [facility]. Patient repeated back time and location of appt, verbalized understanding of plan.

## 2019-07-17 ENCOUNTER — OFFICE VISIT (OUTPATIENT)
Dept: PODIATRY CLINIC | Facility: CLINIC | Age: 54
End: 2019-07-17
Payer: COMMERCIAL

## 2019-07-17 VITALS — DIASTOLIC BLOOD PRESSURE: 81 MMHG | SYSTOLIC BLOOD PRESSURE: 123 MMHG | HEART RATE: 81 BPM

## 2019-07-17 DIAGNOSIS — M20.5X1 HALLUX LIMITUS, RIGHT: Primary | ICD-10-CM

## 2019-07-17 PROCEDURE — 20600 DRAIN/INJ JOINT/BURSA W/O US: CPT | Performed by: PODIATRIST

## 2019-07-17 NOTE — PROGRESS NOTES
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 This 69-year-old female presents for follow-up and continued pain associated with the first metatarsophalangeal joint of the right foot.   Apparently she had a cortisone injection just over 1 no longer is working as it did in the past based on progressive bone-on-bone deformity. There is no edema nor erythema there is no active nor exudative draining there is no sign of infection.   After discussion I agreed to reinject and she signed a written

## 2019-07-17 NOTE — PROGRESS NOTES
Per verbal order from Dr. Sheridan Richards, draw up 0.5ml of 0.5% Marcaine and 10 mg of Depo-Medrol for cortisone injection to right Great toe.  Naif Harris RN

## 2019-08-01 ENCOUNTER — TELEPHONE (OUTPATIENT)
Dept: INTERNAL MEDICINE CLINIC | Facility: CLINIC | Age: 54
End: 2019-08-01

## 2019-08-01 RX ORDER — RIZATRIPTAN BENZOATE 10 MG/1
10 TABLET ORAL AS NEEDED
Qty: 12 TABLET | Refills: 5 | Status: SHIPPED | OUTPATIENT
Start: 2019-08-01 | End: 2019-12-14

## 2019-08-26 ENCOUNTER — TELEPHONE (OUTPATIENT)
Dept: INTERNAL MEDICINE CLINIC | Facility: CLINIC | Age: 54
End: 2019-08-26

## 2019-08-26 ENCOUNTER — APPOINTMENT (OUTPATIENT)
Dept: LAB | Age: 54
End: 2019-08-26
Attending: INTERNAL MEDICINE
Payer: COMMERCIAL

## 2019-08-26 DIAGNOSIS — M79.605 PAIN OF LEFT LOWER EXTREMITY: Primary | ICD-10-CM

## 2019-08-26 LAB — D DIMER PPP FEU-MCNC: <0.27 UG/ML FEU (ref ?–0.55)

## 2019-08-26 PROCEDURE — 85379 FIBRIN DEGRADATION QUANT: CPT | Performed by: INTERNAL MEDICINE

## 2019-08-26 PROCEDURE — 36415 COLL VENOUS BLD VENIPUNCTURE: CPT | Performed by: INTERNAL MEDICINE

## 2019-09-04 ENCOUNTER — HOSPITAL ENCOUNTER (OUTPATIENT)
Dept: GENERAL RADIOLOGY | Facility: HOSPITAL | Age: 54
Discharge: HOME | End: 2019-09-04
Attending: INTERNAL MEDICINE
Payer: COMMERCIAL

## 2019-09-04 DIAGNOSIS — M79.605 PAIN OF LEFT LOWER EXTREMITY: ICD-10-CM

## 2019-09-04 PROCEDURE — 73590 X-RAY EXAM OF LOWER LEG: CPT | Performed by: INTERNAL MEDICINE

## 2019-09-18 ENCOUNTER — TELEPHONE (OUTPATIENT)
Dept: INTERNAL MEDICINE CLINIC | Facility: CLINIC | Age: 54
End: 2019-09-18

## 2019-09-18 RX ORDER — ESTRADIOL 0.1 MG/G
CREAM VAGINAL
Qty: 1 TUBE | Refills: 0 | Status: SHIPPED | OUTPATIENT
Start: 2019-09-18 | End: 2019-09-25

## 2019-09-20 ENCOUNTER — TELEPHONE (OUTPATIENT)
Dept: INTERNAL MEDICINE CLINIC | Facility: CLINIC | Age: 54
End: 2019-09-20

## 2019-09-24 NOTE — TELEPHONE ENCOUNTER
PA for Estradiol 0.1MG/GM cream completed with [employer] via CMM response time 3-5 business days KEY [id number].

## 2019-09-25 RX ORDER — LEVALBUTEROL TARTRATE 45 UG/1
2 AEROSOL, METERED ORAL EVERY 4 HOURS PRN
Qty: 3 INHALER | Refills: 3 | Status: SHIPPED | OUTPATIENT
Start: 2019-09-25 | End: 2020-01-19

## 2019-10-02 NOTE — TELEPHONE ENCOUNTER
Received response from insurance company stating No PA is needed for medication. Pharmacy was aware of message.

## 2019-10-06 ENCOUNTER — TELEPHONE (OUTPATIENT)
Dept: INTERNAL MEDICINE CLINIC | Facility: CLINIC | Age: 54
End: 2019-10-06

## 2019-10-06 RX ORDER — LEVOFLOXACIN 500 MG/1
500 TABLET, FILM COATED ORAL DAILY
Qty: 7 TABLET | Refills: 0 | Status: SHIPPED | OUTPATIENT
Start: 2019-10-06 | End: 2019-10-18

## 2019-10-07 RX ORDER — BENZONATATE 200 MG/1
200 CAPSULE ORAL 3 TIMES DAILY PRN
Qty: 40 CAPSULE | Refills: 1 | Status: SHIPPED | OUTPATIENT
Start: 2019-10-07 | End: 2019-10-18

## 2019-10-16 ENCOUNTER — TELEPHONE (OUTPATIENT)
Dept: INTERNAL MEDICINE CLINIC | Facility: CLINIC | Age: 54
End: 2019-10-16

## 2019-10-16 DIAGNOSIS — M85.80 OSTEOPENIA, UNSPECIFIED LOCATION: ICD-10-CM

## 2019-10-16 DIAGNOSIS — E03.9 HYPOTHYROIDISM, UNSPECIFIED TYPE: Primary | ICD-10-CM

## 2019-10-18 RX ORDER — LEVOFLOXACIN 500 MG/1
500 TABLET, FILM COATED ORAL DAILY
Qty: 7 TABLET | Refills: 0 | Status: SHIPPED | OUTPATIENT
Start: 2019-10-18 | End: 2019-10-24

## 2019-10-18 RX ORDER — BENZONATATE 200 MG/1
200 CAPSULE ORAL 3 TIMES DAILY PRN
Qty: 40 CAPSULE | Refills: 1 | Status: SHIPPED | OUTPATIENT
Start: 2019-10-18 | End: 2019-12-04

## 2019-10-24 RX ORDER — TERBINAFINE HYDROCHLORIDE 250 MG/1
250 TABLET ORAL DAILY
Qty: 90 TABLET | Refills: 1 | Status: SHIPPED | OUTPATIENT
Start: 2019-10-24 | End: 2019-12-04

## 2019-10-26 ENCOUNTER — TELEPHONE (OUTPATIENT)
Dept: INTERNAL MEDICINE CLINIC | Facility: CLINIC | Age: 54
End: 2019-10-26

## 2019-10-26 RX ORDER — LEVOTHYROXINE SODIUM 0.1 MG/1
TABLET ORAL
Qty: 90 TABLET | Refills: 1 | Status: SHIPPED | OUTPATIENT
Start: 2019-10-26 | End: 2020-06-18

## 2019-10-26 NOTE — TELEPHONE ENCOUNTER
Will increase dose, since TSH slightly high at 5.38. I will recheck the TSH in 3 months.     Requested Prescriptions     Signed Prescriptions Disp Refills   • Levothyroxine Sodium 100 MCG Oral Tab 90 tablet 1     Sig: TAKE 1 TABLET BY MOUTH DAILY     Mayra Campbell

## 2019-11-01 ENCOUNTER — OFFICE VISIT (OUTPATIENT)
Dept: PHYSICAL THERAPY | Age: 54
End: 2019-11-01
Attending: ORTHOPAEDIC SURGERY
Payer: COMMERCIAL

## 2019-11-01 DIAGNOSIS — M79.605 LEFT LEG PAIN: ICD-10-CM

## 2019-11-01 DIAGNOSIS — M70.50 PES ANSERINE BURSITIS: ICD-10-CM

## 2019-11-01 PROCEDURE — 97162 PT EVAL MOD COMPLEX 30 MIN: CPT | Performed by: PHYSICAL THERAPIST

## 2019-11-01 PROCEDURE — 97110 THERAPEUTIC EXERCISES: CPT | Performed by: PHYSICAL THERAPIST

## 2019-11-01 NOTE — PATIENT INSTRUCTIONS
Photo 41 onlyrepeated (L) knee extension 10 reps every 2-3 hours; test motion (treadmill running on a 15-20% incline) to compare before and after her exercise.

## 2019-11-01 NOTE — PROGRESS NOTES
LOWER EXTREMITY EVALUATION:   Referring Physician: Dr. 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 Patient was instructed in and issued HEP for: repeated (L) knee extension 10 reps every 2-3 hours; test motion (treadmill running on a 15-20% incline) to compare before and after her exercise.     Charges: PT Eval x1, TherEx x signed by therapist: Alan Perez PT Cert MDT    [de-identified] certification required: Yes  I certify the need for these services furnished under this plan of treatment and while under my care.     X___________________________________________________ Date

## 2019-11-05 ENCOUNTER — NURSE ONLY (OUTPATIENT)
Dept: INTERNAL MEDICINE CLINIC | Facility: CLINIC | Age: 54
End: 2019-11-05
Payer: COMMERCIAL

## 2019-11-05 ENCOUNTER — TELEPHONE (OUTPATIENT)
Dept: INTERNAL MEDICINE CLINIC | Facility: CLINIC | Age: 54
End: 2019-11-05

## 2019-11-05 DIAGNOSIS — Z23 NEED FOR VACCINATION: Primary | ICD-10-CM

## 2019-11-05 DIAGNOSIS — Z23 IMMUNIZATION DUE: ICD-10-CM

## 2019-11-05 PROCEDURE — 90471 IMMUNIZATION ADMIN: CPT | Performed by: INTERNAL MEDICINE

## 2019-11-05 PROCEDURE — 90750 HZV VACC RECOMBINANT IM: CPT | Performed by: INTERNAL MEDICINE

## 2019-11-05 NOTE — PROGRESS NOTES
Patient here for scheduled nurse visit for Shingrix vaccine ordered per MD. VIS given to patient. Shingrix Vaccine given IM left deltoid lot #[id number] exp. 02/12/21, patient tolerated vaccine no adverse reactions noted.

## 2019-11-06 ENCOUNTER — APPOINTMENT (OUTPATIENT)
Dept: PHYSICAL THERAPY | Age: 54
End: 2019-11-06
Attending: INTERNAL MEDICINE
Payer: COMMERCIAL

## 2019-11-08 ENCOUNTER — OFFICE VISIT (OUTPATIENT)
Dept: PHYSICAL THERAPY | Age: 54
End: 2019-11-08
Attending: ORTHOPAEDIC SURGERY
Payer: COMMERCIAL

## 2019-11-08 DIAGNOSIS — M70.50 PES ANSERINE BURSITIS: ICD-10-CM

## 2019-11-08 DIAGNOSIS — M79.605 LEFT LEG PAIN: ICD-10-CM

## 2019-11-08 PROCEDURE — 97110 THERAPEUTIC EXERCISES: CPT | Performed by: PHYSICAL THERAPIST

## 2019-11-08 NOTE — PROGRESS NOTES
Date: 11/8/2019     Dx: Left leg pain (M79.605)         Authorized # of Visits:  8       Referring MD: Candelario Escobar MD visit: none scheduled    Medication Changes since last visit?: No    Subjective: Patient report no pain running on a treadmill over the

## 2019-11-13 ENCOUNTER — APPOINTMENT (OUTPATIENT)
Dept: PHYSICAL THERAPY | Age: 54
End: 2019-11-13
Attending: ORTHOPAEDIC SURGERY
Payer: COMMERCIAL

## 2019-11-15 ENCOUNTER — OFFICE VISIT (OUTPATIENT)
Dept: PHYSICAL THERAPY | Age: 54
End: 2019-11-15
Attending: ORTHOPAEDIC SURGERY
Payer: COMMERCIAL

## 2019-11-15 DIAGNOSIS — M70.50 PES ANSERINE BURSITIS: ICD-10-CM

## 2019-11-15 DIAGNOSIS — M79.605 LEFT LEG PAIN: ICD-10-CM

## 2019-11-15 PROCEDURE — 97110 THERAPEUTIC EXERCISES: CPT | Performed by: PHYSICAL THERAPIST

## 2019-11-15 NOTE — PROGRESS NOTES
Date: 11/15/2019         Dx: Left leg pain (M79.605);  Cervicalgia (M54.2)  Lumbar sprain, sequela (S33.5XXS)             Authorized # of Visits:  8       Referring MD: Francisca Quintanilla  Next MD visit: none scheduled    Medication Changes since last visit?: Assessment:   Hung Crook did not have symptoms in the (L) knee. Her (R) upper trapezius ache/pain was centralized and abolished with c/s retraction with OP. She was progressed with c/s extension with self OP (lean back on chair).   Her low back extension e

## 2019-11-20 ENCOUNTER — APPOINTMENT (OUTPATIENT)
Dept: PHYSICAL THERAPY | Age: 54
End: 2019-11-20
Attending: ORTHOPAEDIC SURGERY
Payer: COMMERCIAL

## 2019-11-22 ENCOUNTER — OFFICE VISIT (OUTPATIENT)
Dept: PHYSICAL THERAPY | Age: 54
End: 2019-11-22
Attending: ORTHOPAEDIC SURGERY
Payer: COMMERCIAL

## 2019-11-22 DIAGNOSIS — M79.605 LEFT LEG PAIN: ICD-10-CM

## 2019-11-22 DIAGNOSIS — M70.50 PES ANSERINE BURSITIS: ICD-10-CM

## 2019-11-22 PROCEDURE — 97110 THERAPEUTIC EXERCISES: CPT | Performed by: PHYSICAL THERAPIST

## 2019-11-22 NOTE — PROGRESS NOTES
Date: 11/22/2019         Dx: Left leg pain (M79.605);  Cervicalgia (M54.2)  Lumbar sprain, sequela (S33.5XXS)             Authorized # of Visits:  8       Referring MD: Salma Escobar MD visit: none scheduled    Medication Changes since last visit?: manual retraction x 10 reps; P, NW (better)      + extension 4 x 20 cts ea; NE    Supinelying with c/s roll x 2 mins    SLY posture correction    Prone: (B) UE extension  2 lbs 10 reps x 10 cts ea; NE    Prone: (B) UE h.abduction 1 lb x 10 reps x 10 cts

## 2019-11-27 ENCOUNTER — APPOINTMENT (OUTPATIENT)
Dept: PHYSICAL THERAPY | Age: 54
End: 2019-11-27
Attending: ORTHOPAEDIC SURGERY
Payer: COMMERCIAL

## 2019-11-29 ENCOUNTER — APPOINTMENT (OUTPATIENT)
Dept: PHYSICAL THERAPY | Age: 54
End: 2019-11-29
Attending: ORTHOPAEDIC SURGERY
Payer: COMMERCIAL

## 2019-12-04 ENCOUNTER — APPOINTMENT (OUTPATIENT)
Dept: PHYSICAL THERAPY | Age: 54
End: 2019-12-04
Attending: ORTHOPAEDIC SURGERY
Payer: COMMERCIAL

## 2019-12-04 ENCOUNTER — TELEPHONE (OUTPATIENT)
Dept: INTERNAL MEDICINE CLINIC | Facility: CLINIC | Age: 54
End: 2019-12-04

## 2019-12-04 DIAGNOSIS — N93.9 ABNORMAL UTERINE BLEEDING: Primary | ICD-10-CM

## 2019-12-04 RX ORDER — INHALER, ASSIST DEVICES
SPACER (EA) MISCELLANEOUS
Qty: 1 DEVICE | Refills: 0 | Status: SHIPPED | OUTPATIENT
Start: 2019-12-04 | End: 2020-01-19

## 2019-12-06 ENCOUNTER — APPOINTMENT (OUTPATIENT)
Dept: PHYSICAL THERAPY | Age: 54
End: 2019-12-06
Attending: ORTHOPAEDIC SURGERY
Payer: COMMERCIAL

## 2019-12-11 ENCOUNTER — APPOINTMENT (OUTPATIENT)
Dept: PHYSICAL THERAPY | Age: 54
End: 2019-12-11
Attending: ORTHOPAEDIC SURGERY
Payer: COMMERCIAL

## 2019-12-13 ENCOUNTER — APPOINTMENT (OUTPATIENT)
Dept: PHYSICAL THERAPY | Age: 54
End: 2019-12-13
Attending: ORTHOPAEDIC SURGERY
Payer: COMMERCIAL

## 2019-12-14 RX ORDER — RIZATRIPTAN BENZOATE 10 MG/1
10 TABLET ORAL AS NEEDED
Qty: 12 TABLET | Refills: 5 | Status: SHIPPED | OUTPATIENT
Start: 2019-12-14 | End: 2020-06-08

## 2019-12-23 PROBLEM — M20.21 ACQUIRED HALLUX RIGIDUS OF RIGHT FOOT: Status: ACTIVE | Noted: 2019-12-23

## 2019-12-30 ENCOUNTER — HOSPITAL ENCOUNTER (OUTPATIENT)
Dept: ULTRASOUND IMAGING | Age: 54
Discharge: HOME | End: 2019-12-30
Attending: INTERNAL MEDICINE
Payer: COMMERCIAL

## 2019-12-30 DIAGNOSIS — N93.9 ABNORMAL UTERINE BLEEDING: ICD-10-CM

## 2019-12-30 PROCEDURE — 76856 US EXAM PELVIC COMPLETE: CPT | Performed by: INTERNAL MEDICINE

## 2019-12-30 PROCEDURE — 76830 TRANSVAGINAL US NON-OB: CPT | Performed by: INTERNAL MEDICINE

## 2020-01-19 ENCOUNTER — TELEPHONE (OUTPATIENT)
Dept: INTERNAL MEDICINE CLINIC | Facility: CLINIC | Age: 55
End: 2020-01-19

## 2020-01-19 RX ORDER — TOPIRAMATE 25 MG/1
25 TABLET ORAL 2 TIMES DAILY
Qty: 180 TABLET | Refills: 3 | Status: SHIPPED | OUTPATIENT
Start: 2020-01-19 | End: 2021-02-09

## 2020-01-31 ENCOUNTER — OFFICE VISIT (OUTPATIENT)
Dept: OBGYN CLINIC | Facility: CLINIC | Age: 55
End: 2020-01-31
Payer: COMMERCIAL

## 2020-01-31 VITALS
WEIGHT: 132.81 LBS | SYSTOLIC BLOOD PRESSURE: 107 MMHG | DIASTOLIC BLOOD PRESSURE: 71 MMHG | BODY MASS INDEX: 24 KG/M2 | HEART RATE: 78 BPM

## 2020-01-31 DIAGNOSIS — Z12.4 SCREENING FOR MALIGNANT NEOPLASM OF CERVIX: ICD-10-CM

## 2020-01-31 DIAGNOSIS — N94.10 DYSPAREUNIA, FEMALE: ICD-10-CM

## 2020-01-31 DIAGNOSIS — Z01.411 ENCOUNTER FOR GYNECOLOGICAL EXAMINATION WITH ABNORMAL FINDING: Primary | ICD-10-CM

## 2020-01-31 DIAGNOSIS — Z12.31 VISIT FOR SCREENING MAMMOGRAM: ICD-10-CM

## 2020-01-31 DIAGNOSIS — N76.0 VAGINITIS AND VULVOVAGINITIS: ICD-10-CM

## 2020-01-31 PROCEDURE — 99386 PREV VISIT NEW AGE 40-64: CPT | Performed by: OBSTETRICS & GYNECOLOGY

## 2020-02-01 NOTE — PROGRESS NOTES
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  Patient presents with:  Gyn Exam: new patient -- got paps by PCP  Other: irritation on vulva / rectal area -- started in Oct after lamisil x 3 months for fo SOCIAL HISTORY:  Social History    Socioeconomic History      Marital status:       Spouse name: Not on file      Number of children: Not on file      Years of education: Not on file      Highest education level: Not on file    Occupational Hist patient does not use an assistive device. .        The patient does live in a home with stairs.       FAMILY HISTORY:  Family History   Problem Relation Age of Onset   • Diabetes Father    • Hypertension Father    • Cancer Father    • Other (CVAs) Father incontinence  Skin/Breast:   denies any breast pain, lumps, or discharge  Neurological:    denies frequent severe headaches  Psychiatric:   denies depression or anxiety, thoughts of harming self or others  Heme/Lymph:    denies easy bruising or bleeding , THIN PREP COLLECTION  -     THINPREP PAP SMEAR ONLY      Pap w/ HPV done. ASSCP guidelines. Annual exams encouraged. Mammogram ordered. Annual mammograms encouraged with annual MD breast exam. SBE encouraged. Call if any vaginal bleeding.   Encouraged 15

## 2020-02-02 LAB — HPV I/H RISK 1 DNA SPEC QL NAA+PROBE: NEGATIVE

## 2020-02-22 ENCOUNTER — HOSPITAL ENCOUNTER (EMERGENCY)
Facility: HOSPITAL | Age: 55
Discharge: HOME | End: 2020-02-22
Attending: EMERGENCY MEDICINE
Payer: COMMERCIAL

## 2020-02-22 ENCOUNTER — APPOINTMENT (OUTPATIENT)
Dept: GENERAL RADIOLOGY | Facility: HOSPITAL | Age: 55
End: 2020-02-22
Attending: EMERGENCY MEDICINE
Payer: COMMERCIAL

## 2020-02-22 VITALS
SYSTOLIC BLOOD PRESSURE: 131 MMHG | WEIGHT: 128 LBS | BODY MASS INDEX: 22.68 KG/M2 | HEIGHT: 63 IN | RESPIRATION RATE: 13 BRPM | DIASTOLIC BLOOD PRESSURE: 82 MMHG | TEMPERATURE: 98 F | HEART RATE: 66 BPM | OXYGEN SATURATION: 99 %

## 2020-02-22 DIAGNOSIS — R07.89 CHEST PAIN, ATYPICAL: Primary | ICD-10-CM

## 2020-02-22 LAB
ANION GAP SERPL CALC-SCNC: 5 MMOL/L (ref 0–18)
BASOPHILS # BLD AUTO: 0.02 X10(3) UL (ref 0–0.2)
BASOPHILS NFR BLD AUTO: 0.4 %
BUN BLD-MCNC: 13 MG/DL (ref 7–18)
BUN/CREAT SERPL: 14.8 (ref 10–20)
CALCIUM BLD-MCNC: 8.4 MG/DL (ref 8.5–10.1)
CHLORIDE SERPL-SCNC: 108 MMOL/L (ref 98–112)
CO2 SERPL-SCNC: 25 MMOL/L (ref 21–32)
CREAT BLD-MCNC: 0.88 MG/DL (ref 0.55–1.02)
D DIMER PPP FEU-MCNC: <0.27 UG/ML FEU (ref ?–0.55)
DEPRECATED RDW RBC AUTO: 38.3 FL (ref 35.1–46.3)
EOSINOPHIL # BLD AUTO: 0.28 X10(3) UL (ref 0–0.7)
EOSINOPHIL NFR BLD AUTO: 5 %
ERYTHROCYTE [DISTWIDTH] IN BLOOD BY AUTOMATED COUNT: 12.6 % (ref 11–15)
GLUCOSE BLD-MCNC: 93 MG/DL (ref 70–99)
HCT VFR BLD AUTO: 37.2 % (ref 35–48)
HGB BLD-MCNC: 12.9 G/DL (ref 12–16)
IMM GRANULOCYTES # BLD AUTO: 0.02 X10(3) UL (ref 0–1)
IMM GRANULOCYTES NFR BLD: 0.4 %
LYMPHOCYTES # BLD AUTO: 1.16 X10(3) UL (ref 1–4)
LYMPHOCYTES NFR BLD AUTO: 20.9 %
MCH RBC QN AUTO: 29.1 PG (ref 26–34)
MCHC RBC AUTO-ENTMCNC: 34.7 G/DL (ref 31–37)
MCV RBC AUTO: 84 FL (ref 80–100)
MONOCYTES # BLD AUTO: 0.31 X10(3) UL (ref 0.1–1)
MONOCYTES NFR BLD AUTO: 5.6 %
NEUTROPHILS # BLD AUTO: 3.76 X10 (3) UL (ref 1.5–7.7)
NEUTROPHILS # BLD AUTO: 3.76 X10(3) UL (ref 1.5–7.7)
NEUTROPHILS NFR BLD AUTO: 67.7 %
OSMOLALITY SERPL CALC.SUM OF ELEC: 286 MOSM/KG (ref 275–295)
PLATELET # BLD AUTO: 195 10(3)UL (ref 150–450)
POTASSIUM SERPL-SCNC: 3.6 MMOL/L (ref 3.5–5.1)
RBC # BLD AUTO: 4.43 X10(6)UL (ref 3.8–5.3)
SODIUM SERPL-SCNC: 138 MMOL/L (ref 136–145)
TROPONIN I SERPL-MCNC: <0.045 NG/ML (ref ?–0.04)
TROPONIN I SERPL-MCNC: <0.045 NG/ML (ref ?–0.04)
WBC # BLD AUTO: 5.6 X10(3) UL (ref 4–11)

## 2020-02-22 PROCEDURE — 93005 ELECTROCARDIOGRAM TRACING: CPT

## 2020-02-22 PROCEDURE — 93010 ELECTROCARDIOGRAM REPORT: CPT | Performed by: EMERGENCY MEDICINE

## 2020-02-22 PROCEDURE — 80048 BASIC METABOLIC PNL TOTAL CA: CPT | Performed by: EMERGENCY MEDICINE

## 2020-02-22 PROCEDURE — 36415 COLL VENOUS BLD VENIPUNCTURE: CPT

## 2020-02-22 PROCEDURE — 85379 FIBRIN DEGRADATION QUANT: CPT | Performed by: EMERGENCY MEDICINE

## 2020-02-22 PROCEDURE — 99284 EMERGENCY DEPT VISIT MOD MDM: CPT

## 2020-02-22 PROCEDURE — 71046 X-RAY EXAM CHEST 2 VIEWS: CPT | Performed by: EMERGENCY MEDICINE

## 2020-02-22 PROCEDURE — 84484 ASSAY OF TROPONIN QUANT: CPT | Performed by: EMERGENCY MEDICINE

## 2020-02-22 PROCEDURE — 85025 COMPLETE CBC W/AUTO DIFF WBC: CPT | Performed by: EMERGENCY MEDICINE

## 2020-02-22 RX ORDER — ALPRAZOLAM 0.5 MG/1
0.5 TABLET ORAL 2 TIMES DAILY PRN
COMMUNITY
End: 2020-03-04

## 2020-02-22 NOTE — ED INITIAL ASSESSMENT (HPI)
patient states she was sitting down and started to have left sided chest pain. States the pain comes and goes. States she has SOB.

## 2020-02-22 NOTE — ED PROVIDER NOTES
Patient Seen in: [facility] Emergency Department    History   Patient presents with:  Chest Pain Angina    Stated Complaint:     HPI    53year old female presenting with chest pain. Pain began at approx 11am while sitting down .  The patient descr by mouth. TOPIRAMATE 50 MG Oral Tab,  TAKE 1 TABLET BY MOUTH TWICE DAILY.  TAKE WITH 25 MG TWICE DAILY FOR TOTAL OF 75MG TWICE DAILY   CITALOPRAM HYDROBROMIDE 20 MG Oral Tab,  TAKE 1 TABLET BY MOUTH EVERY MORNING       Family History   Problem Relation Ag pulsatile masses. : No CVA tenderness. Skin: Warm, dry, no erythema, no rash. Musculoskeletal: Intact distal pulses, no edema, no tenderness, no cyanosis, no clubbing. Good range of motion in all major joints.  No tenderness to palpation or major defor Unchanged chest.  No acute appearing disease.     Dictated by (CST): Claudeen Moss, MD on 2/22/2020 at 12:43     Approved by (CST): Claudeen Moss, MD on 2/22/2020 at 12:44            HEART score for chest pain  History- (Highly suspicious 2pt, Mod 1pt, slig physician.         Heart Score:    HEART Score      Title    Criteria Score   Age:  41-57 Age Score:  1   History:  Slightly Suspicious Hx Score:  0   EKG:  Normal EKG Score:  0   HTN:  No   Hypercholesterolemia:  No   Atherosclerosis/PVD:  No   DM:  No   B

## 2020-03-04 ENCOUNTER — TELEPHONE (OUTPATIENT)
Dept: INTERNAL MEDICINE CLINIC | Facility: CLINIC | Age: 55
End: 2020-03-04

## 2020-03-04 DIAGNOSIS — M85.80 OSTEOPENIA, UNSPECIFIED LOCATION: ICD-10-CM

## 2020-03-04 DIAGNOSIS — Z00.00 ROUTINE HEALTH MAINTENANCE: Primary | ICD-10-CM

## 2020-03-04 RX ORDER — TOPIRAMATE 50 MG/1
TABLET, FILM COATED ORAL
Qty: 180 TABLET | Refills: 3 | Status: SHIPPED | OUTPATIENT
Start: 2020-03-04 | End: 2020-03-22

## 2020-03-06 ENCOUNTER — HOSPITAL ENCOUNTER (OUTPATIENT)
Dept: MAMMOGRAPHY | Age: 55
Discharge: HOME | End: 2020-03-06
Attending: OBSTETRICS & GYNECOLOGY
Payer: COMMERCIAL

## 2020-03-06 DIAGNOSIS — Z12.31 VISIT FOR SCREENING MAMMOGRAM: ICD-10-CM

## 2020-03-06 PROCEDURE — 77067 SCR MAMMO BI INCL CAD: CPT | Performed by: OBSTETRICS & GYNECOLOGY

## 2020-03-06 PROCEDURE — 77063 BREAST TOMOSYNTHESIS BI: CPT | Performed by: OBSTETRICS & GYNECOLOGY

## 2020-03-11 ENCOUNTER — APPOINTMENT (OUTPATIENT)
Dept: LAB | Facility: HOSPITAL | Age: 55
End: 2020-03-11
Attending: INTERNAL MEDICINE
Payer: COMMERCIAL

## 2020-03-11 DIAGNOSIS — M20.21 ACQUIRED HALLUX RIGIDUS OF RIGHT FOOT: ICD-10-CM

## 2020-03-11 LAB
25(OH)D3 SERPL-MCNC: 50.4 NG/ML (ref 30–100)
TSI SER-ACNC: 0.21 MIU/ML (ref 0.36–3.74)

## 2020-03-11 PROCEDURE — 84443 ASSAY THYROID STIM HORMONE: CPT | Performed by: INTERNAL MEDICINE

## 2020-03-11 PROCEDURE — 82306 VITAMIN D 25 HYDROXY: CPT

## 2020-03-11 PROCEDURE — 36415 COLL VENOUS BLD VENIPUNCTURE: CPT | Performed by: INTERNAL MEDICINE

## 2020-03-22 ENCOUNTER — TELEPHONE (OUTPATIENT)
Dept: INTERNAL MEDICINE CLINIC | Facility: CLINIC | Age: 55
End: 2020-03-22

## 2020-03-22 RX ORDER — CITALOPRAM 20 MG/1
TABLET ORAL
Qty: 90 TABLET | Refills: 3 | Status: SHIPPED | OUTPATIENT
Start: 2020-03-22 | End: 2021-02-24

## 2020-03-22 RX ORDER — LEVALBUTEROL TARTRATE 45 UG/1
2 AEROSOL, METERED ORAL EVERY 4 HOURS PRN
Qty: 3 INHALER | Refills: 3 | OUTPATIENT
Start: 2020-03-22 | End: 2020-11-12

## 2020-03-22 RX ORDER — ESTRADIOL 1 MG/1
1 TABLET ORAL DAILY
Qty: 90 TABLET | Refills: 3 | Status: SHIPPED | OUTPATIENT
Start: 2020-03-22 | End: 2020-06-08

## 2020-03-22 RX ORDER — MEDROXYPROGESTERONE ACETATE 5 MG/1
5 TABLET ORAL DAILY
Qty: 90 TABLET | Refills: 3 | Status: SHIPPED | OUTPATIENT
Start: 2020-03-22 | End: 2020-06-08

## 2020-03-29 RX ORDER — VALACYCLOVIR HYDROCHLORIDE 1 G/1
1 TABLET, FILM COATED ORAL EVERY 8 HOURS
Qty: 30 TABLET | Refills: 0 | Status: SHIPPED | OUTPATIENT
Start: 2020-03-29 | End: 2020-04-08

## 2020-05-15 ENCOUNTER — HOSPITAL ENCOUNTER (OUTPATIENT)
Dept: CT IMAGING | Facility: HOSPITAL | Age: 55
Discharge: HOME | End: 2020-05-15
Attending: ORTHOPAEDIC SURGERY
Payer: COMMERCIAL

## 2020-05-15 DIAGNOSIS — T85.848A PAIN FROM IMPLANTED HARDWARE, INITIAL ENCOUNTER: ICD-10-CM

## 2020-05-15 DIAGNOSIS — M79.671 RIGHT FOOT PAIN: ICD-10-CM

## 2020-05-15 PROCEDURE — 73700 CT LOWER EXTREMITY W/O DYE: CPT | Performed by: ORTHOPAEDIC SURGERY

## 2020-05-27 ENCOUNTER — TELEPHONE (OUTPATIENT)
Dept: NEUROLOGY | Facility: CLINIC | Age: 55
End: 2020-05-27

## 2020-05-27 PROBLEM — T85.848A PAIN FROM IMPLANTED HARDWARE, INITIAL ENCOUNTER: Status: ACTIVE | Noted: 2020-05-27

## 2020-05-27 NOTE — TELEPHONE ENCOUNTER
Dr. Martha Soliz called me and she will need to be seen on 6/12/2020 at 12:00 pm.  Please schedule her.

## 2020-05-30 ENCOUNTER — TELEPHONE (OUTPATIENT)
Dept: INTERNAL MEDICINE CLINIC | Facility: CLINIC | Age: 55
End: 2020-05-30

## 2020-05-30 DIAGNOSIS — E03.9 HYPOTHYROIDISM (ACQUIRED): Primary | ICD-10-CM

## 2020-05-30 DIAGNOSIS — Z51.81 MEDICATION MONITORING ENCOUNTER: ICD-10-CM

## 2020-05-30 RX ORDER — PREDNISONE 10 MG/1
TABLET ORAL
Qty: 35 TABLET | Refills: 0 | Status: SHIPPED | OUTPATIENT
Start: 2020-05-30 | End: 2020-06-08

## 2020-05-30 RX ORDER — GABAPENTIN 300 MG/1
300 CAPSULE ORAL 2 TIMES DAILY
Qty: 60 CAPSULE | Refills: 5 | Status: SHIPPED | OUTPATIENT
Start: 2020-05-30 | End: 2020-06-08

## 2020-06-01 RX ORDER — KETOCONAZOLE 20 MG/G
CREAM TOPICAL
Qty: 30 G | Refills: 3 | Status: SHIPPED | OUTPATIENT
Start: 2020-06-01 | End: 2020-06-08

## 2020-06-01 RX ORDER — FLUCONAZOLE 100 MG/1
100 TABLET ORAL NIGHTLY
Qty: 30 TABLET | Refills: 0 | Status: SHIPPED | OUTPATIENT
Start: 2020-06-01 | End: 2020-06-08

## 2020-06-08 RX ORDER — ELETRIPTAN HYDROBROMIDE 40 MG/1
40 TABLET, FILM COATED ORAL AS NEEDED
Qty: 9 TABLET | Refills: 0 | Status: SHIPPED | OUTPATIENT
Start: 2020-06-08 | End: 2020-07-06

## 2020-06-08 RX ORDER — NORETHINDRONE ACETATE AND ETHINYL ESTRADIOL 1; 5 MG/1; UG/1
1 TABLET ORAL DAILY
Qty: 90 TABLET | Refills: 3 | Status: SHIPPED | OUTPATIENT
Start: 2020-06-08 | End: 2020-11-19

## 2020-06-12 ENCOUNTER — APPOINTMENT (OUTPATIENT)
Dept: LAB | Facility: REFERENCE LAB | Age: 55
End: 2020-06-12
Attending: INTERNAL MEDICINE
Payer: COMMERCIAL

## 2020-06-12 ENCOUNTER — OFFICE VISIT (OUTPATIENT)
Dept: NEUROLOGY | Facility: CLINIC | Age: 55
End: 2020-06-12
Payer: COMMERCIAL

## 2020-06-12 VITALS
WEIGHT: 128 LBS | DIASTOLIC BLOOD PRESSURE: 60 MMHG | BODY MASS INDEX: 22.68 KG/M2 | HEIGHT: 63 IN | SYSTOLIC BLOOD PRESSURE: 110 MMHG | RESPIRATION RATE: 16 BRPM

## 2020-06-12 DIAGNOSIS — M51.26 LUMBAR HERNIATED DISC: ICD-10-CM

## 2020-06-12 DIAGNOSIS — M54.41 ACUTE BILATERAL LOW BACK PAIN WITH BILATERAL SCIATICA: ICD-10-CM

## 2020-06-12 DIAGNOSIS — M54.42 ACUTE BILATERAL LOW BACK PAIN WITH BILATERAL SCIATICA: ICD-10-CM

## 2020-06-12 DIAGNOSIS — Z51.81 MEDICATION MONITORING ENCOUNTER: ICD-10-CM

## 2020-06-12 DIAGNOSIS — M20.21 ACQUIRED HALLUX RIGIDUS OF RIGHT FOOT: ICD-10-CM

## 2020-06-12 DIAGNOSIS — M54.16 LUMBAR RADICULOPATHY: Primary | ICD-10-CM

## 2020-06-12 PROCEDURE — 84450 TRANSFERASE (AST) (SGOT): CPT

## 2020-06-12 PROCEDURE — 84443 ASSAY THYROID STIM HORMONE: CPT | Performed by: INTERNAL MEDICINE

## 2020-06-12 PROCEDURE — 84460 ALANINE AMINO (ALT) (SGPT): CPT

## 2020-06-12 PROCEDURE — 84481 FREE ASSAY (FT-3): CPT | Performed by: INTERNAL MEDICINE

## 2020-06-12 PROCEDURE — 36415 COLL VENOUS BLD VENIPUNCTURE: CPT

## 2020-06-12 PROCEDURE — 84439 ASSAY OF FREE THYROXINE: CPT | Performed by: INTERNAL MEDICINE

## 2020-06-12 PROCEDURE — 99204 OFFICE O/P NEW MOD 45 MIN: CPT | Performed by: PHYSICAL MEDICINE & REHABILITATION

## 2020-06-13 NOTE — PATIENT INSTRUCTIONS
Plan  She will get a new MRI of the lumbar spine. She will start PT on the lumbar spine. She might need to have a lumbar ELISEO, but this will depend on the MRI and how she is doing.     She will let me know once she has had the MRI scan and I will rev

## 2020-06-15 ENCOUNTER — TELEPHONE (OUTPATIENT)
Dept: NEUROLOGY | Facility: CLINIC | Age: 55
End: 2020-06-15

## 2020-06-15 ENCOUNTER — PATIENT MESSAGE (OUTPATIENT)
Dept: NEUROLOGY | Facility: CLINIC | Age: 55
End: 2020-06-15

## 2020-06-15 NOTE — TELEPHONE ENCOUNTER
[employer] for authorization of approval for MRI L-spine wo cpt code 88577. Approval was given with Authorization Number: [id number] effective 06/15/20 to 12/12/20. MRI is scheduled on 06/17/20.

## 2020-06-16 NOTE — TELEPHONE ENCOUNTER
From: Nathaniel Feldman MD  To: Judah William MD  Sent: 6/15/2020 1:47 PM CDT  Subject: Visit Rashmi William,  I have 3 questions. I scheduled my MRI at [facility]. Is that okay?  I wasn't sure if that works for you in terms of us

## 2020-06-16 NOTE — TELEPHONE ENCOUNTER
Patient has been scheduled for a Lumbar ELISEO (LEVEL TBD after MRI on 6/17/20) on 6/19/20 at the [facility]. Medications and allergies reviewed. Patient informed to hold aspirins, nsaids, blood thinners, vitamins and fish oils 3-7 days prior to procedure.  Patient

## 2020-06-17 ENCOUNTER — HOSPITAL ENCOUNTER (OUTPATIENT)
Dept: MRI IMAGING | Facility: HOSPITAL | Age: 55
Discharge: HOME | End: 2020-06-17
Attending: PHYSICAL MEDICINE & REHABILITATION
Payer: COMMERCIAL

## 2020-06-17 DIAGNOSIS — M54.16 LUMBAR RADICULOPATHY: ICD-10-CM

## 2020-06-17 PROCEDURE — 72148 MRI LUMBAR SPINE W/O DYE: CPT | Performed by: PHYSICAL MEDICINE & REHABILITATION

## 2020-06-18 ENCOUNTER — TELEPHONE (OUTPATIENT)
Dept: NEUROLOGY | Facility: CLINIC | Age: 55
End: 2020-06-18

## 2020-06-18 DIAGNOSIS — M51.26 LUMBAR HERNIATED DISC: ICD-10-CM

## 2020-06-18 DIAGNOSIS — M54.16 LUMBAR RADICULOPATHY: Primary | ICD-10-CM

## 2020-06-18 PROBLEM — M51.9 LUMBAR DISC DISEASE: Status: ACTIVE | Noted: 2020-06-18

## 2020-06-18 RX ORDER — LEVOTHYROXINE SODIUM 0.07 MG/1
TABLET ORAL
Qty: 90 TABLET | Refills: 3 | Status: SHIPPED | OUTPATIENT
Start: 2020-06-18 | End: 2020-12-09

## 2020-06-18 RX ORDER — GABAPENTIN 100 MG/1
200 CAPSULE ORAL 2 TIMES DAILY
Qty: 120 CAPSULE | Refills: 1 | Status: SHIPPED | OUTPATIENT
Start: 2020-06-18 | End: 2020-07-24

## 2020-06-18 NOTE — TELEPHONE ENCOUNTER
Pt had MRI completed yesterday - will need order for 6/19/20 procedure at [facility] placed per results. Please advise.

## 2020-06-19 ENCOUNTER — OFFICE VISIT (OUTPATIENT)
Dept: SURGERY | Facility: CLINIC | Age: 55
End: 2020-06-19

## 2020-06-19 ENCOUNTER — LAB REQUISITION (OUTPATIENT)
Dept: LAB | Facility: HOSPITAL | Age: 55
End: 2020-06-19
Payer: COMMERCIAL

## 2020-06-19 DIAGNOSIS — M54.16 LUMBAR RADICULOPATHY: Primary | ICD-10-CM

## 2020-06-19 DIAGNOSIS — Z20.828 CONTACT WITH AND (SUSPECTED) EXPOSURE TO OTHER VIRAL COMMUNICABLE DISEASES: ICD-10-CM

## 2020-06-19 DIAGNOSIS — M51.26 LUMBAR HERNIATED DISC: ICD-10-CM

## 2020-06-19 PROCEDURE — 64483 NJX AA&/STRD TFRM EPI L/S 1: CPT | Performed by: PHYSICAL MEDICINE & REHABILITATION

## 2020-06-19 NOTE — PROCEDURES
[facility] [address]    BILATERAL LUMBAR TRANSFORAMINAL   NAME:  Julian Forde MD    MR #:    [id number] :  1965     PHYSICIAN:  Fox Denise        Operative Report    DATE OF PROCEDURE: 2020   PREOPERATIVE DIAGNOSES: 1. L5-S1 intervertebral foramen. At this point in time, the patient's skin was anesthetized with 1 to 2 cc of 1% PF lidocaine without epinephrine.   Then, a 3.5 inch, 22-gauge spinal needle was inserted and directed towards the left L5-S1 intervertebral maame

## 2020-06-22 ENCOUNTER — TELEPHONE (OUTPATIENT)
Dept: NEUROLOGY | Facility: CLINIC | Age: 55
End: 2020-06-22

## 2020-06-22 NOTE — TELEPHONE ENCOUNTER
[employer] for authorization of approval for Bilateral L5 TFESI cpt codes K8039027, [id number]. Authorization is not required. Transaction ID: [id number]. Will inform Nursing.

## 2020-06-26 ENCOUNTER — TELEPHONE (OUTPATIENT)
Dept: PHYSICAL THERAPY | Facility: HOSPITAL | Age: 55
End: 2020-06-26

## 2020-07-06 RX ORDER — ELETRIPTAN HYDROBROMIDE 40 MG/1
40 TABLET, FILM COATED ORAL AS NEEDED
Qty: 9 TABLET | Refills: 5 | Status: SHIPPED | OUTPATIENT
Start: 2020-07-06 | End: 2020-10-18

## 2020-07-06 RX ORDER — CEPHALEXIN 500 MG/1
500 CAPSULE ORAL 2 TIMES DAILY
Qty: 20 CAPSULE | Refills: 0 | Status: SHIPPED | OUTPATIENT
Start: 2020-07-06 | End: 2020-07-16

## 2020-07-08 RX ORDER — ONDANSETRON 4 MG/1
4 TABLET, FILM COATED ORAL EVERY 8 HOURS PRN
Qty: 40 TABLET | Refills: 2 | Status: SHIPPED | OUTPATIENT
Start: 2020-07-08 | End: 2020-07-24

## 2020-07-15 ENCOUNTER — OFFICE VISIT (OUTPATIENT)
Dept: PHYSICAL THERAPY | Facility: HOSPITAL | Age: 55
End: 2020-07-15
Attending: PHYSICAL MEDICINE & REHABILITATION
Payer: COMMERCIAL

## 2020-07-15 DIAGNOSIS — M54.42 ACUTE BILATERAL LOW BACK PAIN WITH BILATERAL SCIATICA: ICD-10-CM

## 2020-07-15 DIAGNOSIS — M54.16 LUMBAR RADICULOPATHY: ICD-10-CM

## 2020-07-15 DIAGNOSIS — M20.21 ACQUIRED HALLUX RIGIDUS OF RIGHT FOOT: ICD-10-CM

## 2020-07-15 DIAGNOSIS — M51.26 LUMBAR HERNIATED DISC: ICD-10-CM

## 2020-07-15 DIAGNOSIS — M54.41 ACUTE BILATERAL LOW BACK PAIN WITH BILATERAL SCIATICA: ICD-10-CM

## 2020-07-15 PROCEDURE — 97161 PT EVAL LOW COMPLEX 20 MIN: CPT

## 2020-07-15 PROCEDURE — 97110 THERAPEUTIC EXERCISES: CPT

## 2020-07-15 NOTE — PROGRESS NOTES
SPINE EVALUATION:   Referring Physician: Dr. Adhikari Boy  Diagnosis:  Lumbar radiculopathy (M54.16)  Lumbar herniated disc (M51.26)  Acute bilateral low back pain with bilateral sciatica (M54.42,M54.41)  Acquired hallux rigidus of right foot (M20.21) Date of Se measures show mild decrease in neuromobility (L)LE, decreased (R) hamstring and (B) piriformis length, apparent LL discrepancy with (L)LE functionally shorter, decreased (L) more than (R) hip IR ROM. Taina Welch   Functional deficits include but are not limited to non injury  · Symptoms will decrease by 75% in lateral hips   · Patient will exhibit improved neuromobility (L)LE  · Patient will demonstrate improved strength of lumbar stabilizers to decrease risk of further irritation    Frequency / Duration: Patient will b

## 2020-07-17 ENCOUNTER — OFFICE VISIT (OUTPATIENT)
Dept: PHYSICAL THERAPY | Facility: HOSPITAL | Age: 55
End: 2020-07-17
Attending: PHYSICAL MEDICINE & REHABILITATION
Payer: COMMERCIAL

## 2020-07-17 PROCEDURE — 97110 THERAPEUTIC EXERCISES: CPT

## 2020-07-17 NOTE — PROGRESS NOTES
Diagnosis: Lumbar radiculopathy (M54.16)  Lumbar herniated disc (M51.26)  Acute bilateral low back pain with bilateral sciatica (M54.42,M54.41)  Acquired hallux rigidus of right foot (M20.21)   Precautions: none  Insurance Type (# Auth): BCBS PPO (NA) Tota

## 2020-07-22 ENCOUNTER — APPOINTMENT (OUTPATIENT)
Dept: PHYSICAL THERAPY | Facility: HOSPITAL | Age: 55
End: 2020-07-22
Attending: PHYSICAL MEDICINE & REHABILITATION
Payer: COMMERCIAL

## 2020-07-24 ENCOUNTER — OFFICE VISIT (OUTPATIENT)
Dept: PHYSICAL THERAPY | Facility: HOSPITAL | Age: 55
End: 2020-07-24
Attending: PHYSICAL MEDICINE & REHABILITATION
Payer: COMMERCIAL

## 2020-07-24 PROCEDURE — 97110 THERAPEUTIC EXERCISES: CPT

## 2020-07-29 ENCOUNTER — APPOINTMENT (OUTPATIENT)
Dept: PHYSICAL THERAPY | Facility: HOSPITAL | Age: 55
End: 2020-07-29
Attending: PHYSICAL MEDICINE & REHABILITATION
Payer: COMMERCIAL

## 2020-07-31 ENCOUNTER — APPOINTMENT (OUTPATIENT)
Dept: PHYSICAL THERAPY | Facility: HOSPITAL | Age: 55
End: 2020-07-31
Attending: PHYSICAL MEDICINE & REHABILITATION
Payer: COMMERCIAL

## 2020-08-05 ENCOUNTER — APPOINTMENT (OUTPATIENT)
Dept: PHYSICAL THERAPY | Facility: HOSPITAL | Age: 55
End: 2020-08-05
Attending: PHYSICAL MEDICINE & REHABILITATION
Payer: COMMERCIAL

## 2020-08-07 ENCOUNTER — OFFICE VISIT (OUTPATIENT)
Dept: PHYSICAL THERAPY | Facility: HOSPITAL | Age: 55
End: 2020-08-07
Attending: PHYSICAL MEDICINE & REHABILITATION
Payer: COMMERCIAL

## 2020-08-07 PROCEDURE — 97110 THERAPEUTIC EXERCISES: CPT

## 2020-08-07 NOTE — PROGRESS NOTES
Diagnosis: Lumbar radiculopathy (M54.16)  Lumbar herniated disc (M51.26)  Acute bilateral low back pain with bilateral sciatica (M54.42,M54.41)  Acquired hallux rigidus of right foot (M20.21)   Precautions: none  Insurance Type (# Auth): BCBS PPO (NA) Tota improved.        Plan: Review HEP; assess abd, clam. Assess ITB

## 2020-08-12 ENCOUNTER — APPOINTMENT (OUTPATIENT)
Dept: PHYSICAL THERAPY | Facility: HOSPITAL | Age: 55
End: 2020-08-12
Attending: PHYSICAL MEDICINE & REHABILITATION
Payer: COMMERCIAL

## 2020-08-21 ENCOUNTER — OFFICE VISIT (OUTPATIENT)
Dept: PHYSICAL THERAPY | Facility: HOSPITAL | Age: 55
End: 2020-08-21
Attending: PHYSICAL MEDICINE & REHABILITATION
Payer: COMMERCIAL

## 2020-08-21 PROCEDURE — 97110 THERAPEUTIC EXERCISES: CPT

## 2020-08-21 PROCEDURE — 97140 MANUAL THERAPY 1/> REGIONS: CPT

## 2020-08-21 NOTE — PROGRESS NOTES
Diagnosis: Lumbar radiculopathy (M54.16)  Lumbar herniated disc (M51.26)  Acute bilateral low back pain with bilateral sciatica (M54.42,M54.41)  Acquired hallux rigidus of right foot (M20.21)   Precautions: none  Insurance Type (# Auth): BCBS PPO (NA) Tota (L)LE, twan/NAEL/REIL, TA, bent knee fall out, heel slide, 90/90 to piston, 4 point LE to diagonal reach, prone sequenced gluts, bridge to bridge with heel lift, piriformis stretch, prone knee pumps    Education: no incline trial at 3.0 x 30 min and if well

## 2020-08-26 ENCOUNTER — NURSE ONLY (OUTPATIENT)
Dept: LAB | Age: 55
End: 2020-08-26
Attending: PREVENTIVE MEDICINE

## 2020-08-26 ENCOUNTER — OFFICE VISIT (OUTPATIENT)
Dept: PHYSICAL THERAPY | Facility: HOSPITAL | Age: 55
End: 2020-08-26
Attending: PHYSICAL MEDICINE & REHABILITATION
Payer: COMMERCIAL

## 2020-08-26 DIAGNOSIS — Z78.9 PARTICIPANT IN HEALTH AND WELLNESS PLAN: Primary | ICD-10-CM

## 2020-08-26 DIAGNOSIS — Z78.9 PARTICIPANT IN HEALTH AND WELLNESS PLAN: ICD-10-CM

## 2020-08-26 PROCEDURE — 97140 MANUAL THERAPY 1/> REGIONS: CPT

## 2020-08-26 PROCEDURE — 97110 THERAPEUTIC EXERCISES: CPT

## 2020-08-26 PROCEDURE — 86769 SARS-COV-2 COVID-19 ANTIBODY: CPT

## 2020-08-26 NOTE — PROGRESS NOTES
Diagnosis: Lumbar radiculopathy (M54.16)  Lumbar herniated disc (M51.26)  Acute bilateral low back pain with bilateral sciatica (M54.42,M54.41)  Acquired hallux rigidus of right foot (M20.21)   Precautions: none  Insurance Type (# Auth): BCBS PPO (NA) Tota (L)LE, twan/NAEL/REIL, TA, bent knee fall out, heel slide, 90/90 to piston, 4 point LE to diagonal reach, prone sequenced gluts, bridge to bridge with heel lift, piriformis stretch, prone knee pumps    Education:     Assessment: increased resting tone with

## 2020-08-27 LAB — SARS-COV-2 IGG SERPLBLD QL IA.RAPID: NEGATIVE

## 2020-09-09 ENCOUNTER — TELEPHONE (OUTPATIENT)
Dept: INTERNAL MEDICINE CLINIC | Facility: CLINIC | Age: 55
End: 2020-09-09

## 2020-09-09 ENCOUNTER — OFFICE VISIT (OUTPATIENT)
Dept: PHYSICAL THERAPY | Facility: HOSPITAL | Age: 55
End: 2020-09-09
Attending: PHYSICAL MEDICINE & REHABILITATION
Payer: COMMERCIAL

## 2020-09-09 DIAGNOSIS — M54.2 CERVICALGIA: Primary | ICD-10-CM

## 2020-09-09 DIAGNOSIS — M54.2 CERVICALGIA: ICD-10-CM

## 2020-09-09 PROCEDURE — 97110 THERAPEUTIC EXERCISES: CPT

## 2020-09-09 PROCEDURE — 97162 PT EVAL MOD COMPLEX 30 MIN: CPT

## 2020-09-09 NOTE — PROGRESS NOTES
SPINE EVALUATION:   Referring Physician: Dr. 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     Thank you for your referral. P

## 2020-09-11 ENCOUNTER — OFFICE VISIT (OUTPATIENT)
Dept: PHYSICAL THERAPY | Facility: HOSPITAL | Age: 55
End: 2020-09-11
Attending: PHYSICAL MEDICINE & REHABILITATION
Payer: COMMERCIAL

## 2020-09-11 PROCEDURE — 97140 MANUAL THERAPY 1/> REGIONS: CPT

## 2020-09-11 NOTE — PROGRESS NOTES
Diagnosis: cervicalgia   Precautions: none  Insurance Type (# Auth): BCBS PPO (60) Total Timed Treatment: 38 min  Date POC Expires: 12/8    Total Treatment time: 45 min       Charges:  Man There 3    Treatment Number: 2/12    Subjective: Pt states she had s

## 2020-09-16 ENCOUNTER — OFFICE VISIT (OUTPATIENT)
Dept: PHYSICAL THERAPY | Facility: HOSPITAL | Age: 55
End: 2020-09-16
Attending: PHYSICAL MEDICINE & REHABILITATION
Payer: COMMERCIAL

## 2020-09-16 PROCEDURE — 97140 MANUAL THERAPY 1/> REGIONS: CPT

## 2020-09-16 NOTE — PROGRESS NOTES
Diagnosis: cervicalgia   Precautions: none  Insurance Type (# Auth): BCBS PPO (60) Total Timed Treatment: 38 min  Date POC Expires: 12/8    Total Treatment time: 45 min       Charges:  Man There 3    Treatment Number: 3/12    Subjective: Pt states the cervi

## 2020-09-25 ENCOUNTER — OFFICE VISIT (OUTPATIENT)
Dept: PHYSICAL THERAPY | Facility: HOSPITAL | Age: 55
End: 2020-09-25
Attending: PHYSICAL MEDICINE & REHABILITATION
Payer: COMMERCIAL

## 2020-09-25 PROCEDURE — 97110 THERAPEUTIC EXERCISES: CPT

## 2020-09-25 NOTE — PROGRESS NOTES
Diagnosis: Lumbar radiculopathy (M54.16)  Lumbar herniated disc (M51.26)  Acute bilateral low back pain with bilateral sciatica (M54.42,M54.41)  Acquired hallux rigidus of right foot (M20.21)   Precautions: none  Insurance Type (# Auth): BCBS PPO (NA) Tota reach  Bridge to bridge with heel lift  Prone glut retraining  (L)LE knee pumps sup and sit  (L)LE flossing  piston  Prone sequenced gluts  Prone knee pumps  Modified sup knee pumps = SAQ over roll  Seated nerve slides  clam x 10  abd x 10  Knee pumps sup

## 2020-09-28 ENCOUNTER — TELEPHONE (OUTPATIENT)
Dept: INTERNAL MEDICINE CLINIC | Facility: CLINIC | Age: 55
End: 2020-09-28

## 2020-09-28 RX ORDER — SUVOREXANT 10 MG/1
10 TABLET, FILM COATED ORAL NIGHTLY PRN
Qty: 30 TABLET | Refills: 5 | Status: SHIPPED | OUTPATIENT
Start: 2020-09-28 | End: 2020-10-18

## 2020-09-29 RX ORDER — CELECOXIB 200 MG/1
200 CAPSULE ORAL DAILY
Qty: 90 CAPSULE | Refills: 3 | Status: SHIPPED | OUTPATIENT
Start: 2020-09-29 | End: 2020-11-12

## 2020-10-01 ENCOUNTER — TELEPHONE (OUTPATIENT)
Dept: INTERNAL MEDICINE CLINIC | Facility: CLINIC | Age: 55
End: 2020-10-01

## 2020-10-01 NOTE — TELEPHONE ENCOUNTER
•  Suvorexant (BELSOMRA) 10 MG Oral Tab, Take 10 mg by mouth nightly as needed. , Disp: 30 tablet, Rfl: 5

## 2020-10-02 ENCOUNTER — TELEPHONE (OUTPATIENT)
Dept: NEUROLOGY | Facility: CLINIC | Age: 55
End: 2020-10-02

## 2020-10-02 DIAGNOSIS — M54.16 LUMBAR RADICULOPATHY: Primary | ICD-10-CM

## 2020-10-02 DIAGNOSIS — M51.9 LUMBAR DISC DISEASE: ICD-10-CM

## 2020-10-02 DIAGNOSIS — M51.26 LUMBAR HERNIATED DISC: ICD-10-CM

## 2020-10-02 NOTE — TELEPHONE ENCOUNTER
Prior authorization for belsomra has been initiated through [employer] using keycode: [id number] It takes about 1-5 business days for a decision to come back.

## 2020-10-02 NOTE — TELEPHONE ENCOUNTER
I called Dr. Jose Estrada and she states that the bilateral L5 TFESI's helped about 50%. She stated that her HEP is not helping and might be making her symptoms worse. She is still seeing Harleen for the PT.   The pain in the left lower leg has increased as of 3 w

## 2020-10-02 NOTE — TELEPHONE ENCOUNTER
Patient has been scheduled for a left L4 and left L5 TFESIs  on 10/9/2020 at the [facility]. Medications and allergies reviewed. Patient informed to hold aspirins, nsaids, blood thinners, multivitamins, vitamin E and fish oils 3-7 days prior to procedure.  Sheyla

## 2020-10-02 NOTE — TELEPHONE ENCOUNTER
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  There has been worsening pain and increased sciatica causing difficulty concentrating

## 2020-10-02 NOTE — TELEPHONE ENCOUNTER
Received voicemail from Dr. Lucía Navarro stating she continues to have pain. Completed some session of PT and continues hep with no improvement. Requesting injection preferably on a Wednesday or Friday    Returned Dr. Lucía Navarro call.  States some improvement post

## 2020-10-05 RX ORDER — GABAPENTIN 100 MG/1
200 CAPSULE ORAL 2 TIMES DAILY
Qty: 360 CAPSULE | Refills: 0 | Status: SHIPPED | OUTPATIENT
Start: 2020-10-05 | End: 2020-10-07

## 2020-10-06 NOTE — TELEPHONE ENCOUNTER
Prior authorization for [facility] has been denied.      Your doctor must send in chart notes showing you meet one of the following:  Have tried and had poor response to a generic nonbenzodiazepine hypnotic drug like eszopiclone, zaleplon, or zolpidem 5 mg or

## 2020-10-07 ENCOUNTER — APPOINTMENT (OUTPATIENT)
Dept: PHYSICAL THERAPY | Facility: HOSPITAL | Age: 55
End: 2020-10-07
Attending: PHYSICAL MEDICINE & REHABILITATION
Payer: COMMERCIAL

## 2020-10-07 ENCOUNTER — TELEPHONE (OUTPATIENT)
Dept: INTERNAL MEDICINE CLINIC | Facility: CLINIC | Age: 55
End: 2020-10-07

## 2020-10-07 DIAGNOSIS — M21.70 LEG LENGTH DISCREPANCY: Primary | ICD-10-CM

## 2020-10-07 RX ORDER — GABAPENTIN 100 MG/1
200 CAPSULE ORAL 2 TIMES DAILY
Qty: 360 CAPSULE | Refills: 3 | Status: SHIPPED | OUTPATIENT
Start: 2020-10-07 | End: 2020-11-18

## 2020-10-07 NOTE — TELEPHONE ENCOUNTER
SCRIPT CLARIFICATION:    Gabapentin 100mg cap was written for zero refills. Is is OK to dispense a 90 day supply with 3 refills? •  gabapentin 100 MG Oral Cap, Take 2 capsules (200 mg total) by mouth 2 (two) times daily. , Disp: 360 capsule, Rfl: 0

## 2020-10-09 ENCOUNTER — OFFICE VISIT (OUTPATIENT)
Dept: SURGERY | Facility: CLINIC | Age: 55
End: 2020-10-09

## 2020-10-09 DIAGNOSIS — M51.9 LUMBAR DISC DISEASE: ICD-10-CM

## 2020-10-09 DIAGNOSIS — M54.16 LUMBAR RADICULOPATHY: Primary | ICD-10-CM

## 2020-10-09 DIAGNOSIS — M51.26 LUMBAR HERNIATED DISC: ICD-10-CM

## 2020-10-09 PROCEDURE — 64484 NJX AA&/STRD TFRM EPI L/S EA: CPT | Performed by: PHYSICAL MEDICINE & REHABILITATION

## 2020-10-09 PROCEDURE — 64483 NJX AA&/STRD TFRM EPI L/S 1: CPT | Performed by: PHYSICAL MEDICINE & REHABILITATION

## 2020-10-09 NOTE — PROGRESS NOTES
[facility]    2-LEVEL LUMBAR TRANSFORAMINAL   NAME:  Ewelina Fam MD    MR #:    [id number] :  1965     PHYSICIAN:  Leilani Denise        Operative Report    DATE OF PROCEDURE: 10/9/2020   PREOPERATIVE DIAGNOSES: 1.  b 1.5 cc of 1% PF lidocaine without epinephrine at each site. After this, the needles were removed. Each site was cleaned. Band-Aids were applied. The patient was transferred to the cart and into [facility].   The patient was given discharge instructions and maurice

## 2020-10-13 ENCOUNTER — TELEPHONE (OUTPATIENT)
Dept: NEUROLOGY | Facility: CLINIC | Age: 55
End: 2020-10-13

## 2020-10-13 NOTE — TELEPHONE ENCOUNTER
[employer] for authorization of approval for Left L4 & Left L5 TFESI cpt ccodes [id number], [id number], [id number]. Authorization is not required. Transaction ID: [id number]. Procedure was done on 10/09/20.

## 2020-10-14 ENCOUNTER — HOSPITAL ENCOUNTER (OUTPATIENT)
Dept: GENERAL RADIOLOGY | Facility: HOSPITAL | Age: 55
Discharge: HOME | End: 2020-10-14
Attending: INTERNAL MEDICINE
Payer: COMMERCIAL

## 2020-10-14 DIAGNOSIS — M21.70 LEG LENGTH DISCREPANCY: ICD-10-CM

## 2020-10-14 PROCEDURE — 77073 BONE LENGTH STUDIES: CPT | Performed by: INTERNAL MEDICINE

## 2020-10-18 RX ORDER — ELETRIPTAN HYDROBROMIDE 40 MG/1
40 TABLET, FILM COATED ORAL AS NEEDED
Qty: 27 TABLET | Refills: 3 | Status: SHIPPED | OUTPATIENT
Start: 2020-10-18 | End: 2020-10-28

## 2020-10-18 RX ORDER — CLINDAMYCIN PHOSPHATE 10 MG/G
1 GEL TOPICAL NIGHTLY
Qty: 30 G | Refills: 3 | Status: SHIPPED | OUTPATIENT
Start: 2020-10-18 | End: 2020-10-28

## 2020-10-28 ENCOUNTER — TELEPHONE (OUTPATIENT)
Dept: INTERNAL MEDICINE CLINIC | Facility: CLINIC | Age: 55
End: 2020-10-28

## 2020-10-28 RX ORDER — CLINDAMYCIN PHOSPHATE 10 MG/G
1 GEL TOPICAL NIGHTLY
Qty: 30 G | Refills: 11 | Status: SHIPPED | OUTPATIENT
Start: 2020-10-28 | End: 2020-11-18

## 2020-10-28 RX ORDER — ELETRIPTAN HYDROBROMIDE 40 MG/1
40 TABLET, FILM COATED ORAL AS NEEDED
Qty: 12 TABLET | Refills: 11 | Status: SHIPPED | OUTPATIENT
Start: 2020-10-28 | End: 2020-12-23

## 2020-10-30 ENCOUNTER — LAB ENCOUNTER (OUTPATIENT)
Dept: LAB | Age: 55
End: 2020-10-30
Attending: EMERGENCY MEDICINE
Payer: COMMERCIAL

## 2020-10-30 ENCOUNTER — TELEPHONE (OUTPATIENT)
Dept: INTERNAL MEDICINE CLINIC | Facility: HOSPITAL | Age: 55
End: 2020-10-30

## 2020-10-30 DIAGNOSIS — Z20.822 SUSPECTED 2019 NOVEL CORONAVIRUS INFECTION: Primary | ICD-10-CM

## 2020-10-30 DIAGNOSIS — Z20.822 SUSPECTED 2019 NOVEL CORONAVIRUS INFECTION: ICD-10-CM

## 2020-11-12 RX ORDER — TRETINOIN 0.4 MG/G
GEL TOPICAL
Qty: 20 G | Refills: 5 | Status: SHIPPED | OUTPATIENT
Start: 2020-11-12 | End: 2020-11-18

## 2020-11-13 ENCOUNTER — TELEPHONE (OUTPATIENT)
Dept: INTERNAL MEDICINE CLINIC | Facility: CLINIC | Age: 55
End: 2020-11-13

## 2020-11-13 ENCOUNTER — TELEPHONE (OUTPATIENT)
Dept: OBGYN CLINIC | Facility: CLINIC | Age: 55
End: 2020-11-13

## 2020-11-13 DIAGNOSIS — R23.3 PETECHIAL RASH: ICD-10-CM

## 2020-11-13 DIAGNOSIS — E03.9 HYPOTHYROIDISM, UNSPECIFIED TYPE: ICD-10-CM

## 2020-11-13 DIAGNOSIS — L70.9 ADULT ACNE: Primary | ICD-10-CM

## 2020-11-13 NOTE — TELEPHONE ENCOUNTER
Current Outpatient Medications   Medication Sig Dispense Refill   • Tretinoin Microsphere 0.04 % External Gel Apply a small amount to the face qhs 20 g 5     Plan does not cover medication.  Please call plan [phone] to initiate PA or call/fax pharmacy

## 2020-11-13 NOTE — TELEPHONE ENCOUNTER
Since postmenopausal, can be done anytime. Ask Dr. Tommy Goltz which days works best for her & will need to find slot  Schedule endosee for this patient:  **Do referral if needed. **    Timing: anytime    Diagnosis:  bleed    Blood tests: n/a    Medication p

## 2020-11-16 NOTE — TELEPHONE ENCOUNTER
Endosee scheduled with Dr. Kiara Godfrey on 11/18/20 at 3pm. Pt. Instructed about the procedure and to take 2 Aleve 1 hr prior to appt. Pt. Verbalized understanding.

## 2020-11-16 NOTE — TELEPHONE ENCOUNTER
Prior authorization for tretinoin microsphere gel has been initiated through [employer] using keycode:  [id number] It takes about 1-5 business days for a decision to come back.

## 2020-11-16 NOTE — TELEPHONE ENCOUNTER
No PA needed    Date of Service 2020Transaction ID: [id number] Transaction Date:  7:56 am Customer ID: [id number]   Edit  Print  [facility] Spouse of Subscriber  Member ID [id number]   1965  Gender Female  Plan / Coverage Date Marsha Carter

## 2020-11-18 ENCOUNTER — OFFICE VISIT (OUTPATIENT)
Dept: OBGYN CLINIC | Facility: CLINIC | Age: 55
End: 2020-11-18
Payer: COMMERCIAL

## 2020-11-18 VITALS
SYSTOLIC BLOOD PRESSURE: 131 MMHG | HEART RATE: 84 BPM | WEIGHT: 137.63 LBS | BODY MASS INDEX: 24 KG/M2 | DIASTOLIC BLOOD PRESSURE: 84 MMHG

## 2020-11-18 DIAGNOSIS — N95.0 PMB (POSTMENOPAUSAL BLEEDING): Primary | ICD-10-CM

## 2020-11-18 DIAGNOSIS — N95.0 POSTMENOPAUSAL BLEEDING: ICD-10-CM

## 2020-11-18 PROCEDURE — 3075F SYST BP GE 130 - 139MM HG: CPT | Performed by: OBSTETRICS & GYNECOLOGY

## 2020-11-18 PROCEDURE — 3079F DIAST BP 80-89 MM HG: CPT | Performed by: OBSTETRICS & GYNECOLOGY

## 2020-11-18 PROCEDURE — 58558 HYSTEROSCOPY BIOPSY: CPT | Performed by: OBSTETRICS & GYNECOLOGY

## 2020-11-18 RX ORDER — ESOMEPRAZOLE MAGNESIUM 20 MG/1
FOR SUSPENSION ORAL
COMMUNITY
Start: 2020-07-10 | End: 2020-12-07

## 2020-11-18 RX ORDER — TOPIRAMATE 50 MG/1
TABLET, FILM COATED ORAL
COMMUNITY
Start: 2020-08-03 | End: 2021-02-09

## 2020-11-20 NOTE — PROCEDURES
Endosee Procedure     Consent signed. Procedure discussed with the patient in detail including indication, risks, benefits, alternatives and complications.     Indication:   bleed -- persistent; on femHRT 1/5    Procedure:   operative hysteroscopy wit

## 2020-11-24 ENCOUNTER — LAB ENCOUNTER (OUTPATIENT)
Dept: LAB | Facility: HOSPITAL | Age: 55
End: 2020-11-24
Attending: INTERNAL MEDICINE
Payer: COMMERCIAL

## 2020-11-24 DIAGNOSIS — R23.3 PETECHIAL RASH: ICD-10-CM

## 2020-11-24 DIAGNOSIS — E03.9 HYPOTHYROIDISM, UNSPECIFIED TYPE: ICD-10-CM

## 2020-11-24 PROCEDURE — 85025 COMPLETE CBC W/AUTO DIFF WBC: CPT

## 2020-11-24 PROCEDURE — 36415 COLL VENOUS BLD VENIPUNCTURE: CPT

## 2020-11-24 PROCEDURE — 84439 ASSAY OF FREE THYROXINE: CPT

## 2020-11-24 PROCEDURE — 85610 PROTHROMBIN TIME: CPT

## 2020-11-24 PROCEDURE — 80053 COMPREHEN METABOLIC PANEL: CPT

## 2020-11-24 PROCEDURE — 85730 THROMBOPLASTIN TIME PARTIAL: CPT

## 2020-11-24 PROCEDURE — 84443 ASSAY THYROID STIM HORMONE: CPT

## 2020-12-07 ENCOUNTER — TELEPHONE (OUTPATIENT)
Dept: INTERNAL MEDICINE CLINIC | Facility: CLINIC | Age: 55
End: 2020-12-07

## 2020-12-07 RX ORDER — CLOBETASOL PROPIONATE 0.5 MG/G
1 OINTMENT TOPICAL 2 TIMES DAILY
Qty: 90 G | Refills: 3 | Status: SHIPPED | OUTPATIENT
Start: 2020-12-07 | End: 2021-02-09

## 2020-12-07 RX ORDER — CLOBETASOL PROPIONATE 0.5 MG/ML
1 LOTION TOPICAL 2 TIMES DAILY
Qty: 236 ML | Refills: 0 | Status: SHIPPED | OUTPATIENT
Start: 2020-12-07 | End: 2021-02-09

## 2020-12-07 RX ORDER — NORETHINDRONE ACETATE AND ETHINYL ESTRADIOL .5; 2.5 MG/1; UG/1
1 TABLET ORAL DAILY
Qty: 90 TABLET | Refills: 2 | Status: SHIPPED | OUTPATIENT
Start: 2020-12-07 | End: 2021-02-09

## 2020-12-09 RX ORDER — LEVOTHYROXINE SODIUM 88 UG/1
88 TABLET ORAL
Qty: 90 TABLET | Refills: 3 | Status: SHIPPED | OUTPATIENT
Start: 2020-12-09 | End: 2021-10-06

## 2020-12-11 RX ORDER — PREDNISONE 10 MG/1
TABLET ORAL
Qty: 42 TABLET | Refills: 0 | Status: SHIPPED | OUTPATIENT
Start: 2020-12-11 | End: 2021-02-09

## 2020-12-17 ENCOUNTER — IMMUNIZATION (OUTPATIENT)
Dept: LAB | Facility: HOSPITAL | Age: 55
End: 2020-12-17
Attending: PREVENTIVE MEDICINE
Payer: COMMERCIAL

## 2020-12-17 DIAGNOSIS — Z23 NEED FOR VACCINATION: ICD-10-CM

## 2020-12-17 PROCEDURE — 0001A PFIZER-BIONTECH COVID-19 VACCINE: CPT

## 2020-12-23 RX ORDER — CLOBETASOL PROPIONATE 0.05 G/100ML
1 SHAMPOO TOPICAL DAILY
Qty: 118 ML | Refills: 1 | Status: SHIPPED | OUTPATIENT
Start: 2020-12-23 | End: 2021-05-12

## 2020-12-23 RX ORDER — ELETRIPTAN HYDROBROMIDE 40 MG/1
40 TABLET, FILM COATED ORAL AS NEEDED
Qty: 12 TABLET | Refills: 11 | Status: SHIPPED | OUTPATIENT
Start: 2020-12-23 | End: 2021-04-08

## 2020-12-27 RX ORDER — KETOCONAZOLE 20 MG/G
CREAM TOPICAL
Qty: 60 G | Refills: 0 | Status: SHIPPED | OUTPATIENT
Start: 2020-12-27 | End: 2021-02-09

## 2021-01-07 ENCOUNTER — IMMUNIZATION (OUTPATIENT)
Dept: LAB | Facility: HOSPITAL | Age: 56
End: 2021-01-07
Attending: PREVENTIVE MEDICINE
Payer: COMMERCIAL

## 2021-01-07 DIAGNOSIS — Z23 NEED FOR VACCINATION: ICD-10-CM

## 2021-01-07 PROCEDURE — 0002A SARSCOV2 VAC 30MCG/0.3ML IM: CPT

## 2021-01-27 ENCOUNTER — TELEPHONE (OUTPATIENT)
Dept: INTERNAL MEDICINE CLINIC | Facility: CLINIC | Age: 56
End: 2021-01-27

## 2021-02-09 ENCOUNTER — TELEPHONE (OUTPATIENT)
Dept: INTERNAL MEDICINE CLINIC | Facility: CLINIC | Age: 56
End: 2021-02-09

## 2021-02-09 DIAGNOSIS — Z00.00 HEALTHCARE MAINTENANCE: ICD-10-CM

## 2021-02-09 DIAGNOSIS — M85.80 OSTEOPENIA, UNSPECIFIED LOCATION: ICD-10-CM

## 2021-02-09 DIAGNOSIS — Z12.31 BREAST CANCER SCREENING BY MAMMOGRAM: Primary | ICD-10-CM

## 2021-02-09 DIAGNOSIS — Z20.822 ENCOUNTER FOR LABORATORY TESTING FOR COVID-19 VIRUS: ICD-10-CM

## 2021-02-09 RX ORDER — NORETHINDRONE ACETATE AND ETHINYL ESTRADIOL 1; 5 MG/1; UG/1
1 TABLET ORAL DAILY
Qty: 90 TABLET | Refills: 0 | OUTPATIENT
Start: 2021-02-09 | End: 2021-02-24

## 2021-02-16 ENCOUNTER — LAB ENCOUNTER (OUTPATIENT)
Dept: LAB | Facility: HOSPITAL | Age: 56
End: 2021-02-16
Attending: INTERNAL MEDICINE
Payer: COMMERCIAL

## 2021-02-16 DIAGNOSIS — Z00.00 HEALTHCARE MAINTENANCE: ICD-10-CM

## 2021-02-16 LAB
ANION GAP SERPL CALC-SCNC: 3 MMOL/L (ref 0–18)
BUN BLD-MCNC: 10 MG/DL (ref 7–18)
BUN/CREAT SERPL: 12.2 (ref 10–20)
CALCIUM BLD-MCNC: 9 MG/DL (ref 8.5–10.1)
CHLORIDE SERPL-SCNC: 109 MMOL/L (ref 98–112)
CO2 SERPL-SCNC: 29 MMOL/L (ref 21–32)
CREAT BLD-MCNC: 0.82 MG/DL
GLUCOSE BLD-MCNC: 68 MG/DL (ref 70–99)
OSMOLALITY SERPL CALC.SUM OF ELEC: 289 MOSM/KG (ref 275–295)
PATIENT FASTING Y/N/NP: NO
POTASSIUM SERPL-SCNC: 4 MMOL/L (ref 3.5–5.1)
SODIUM SERPL-SCNC: 141 MMOL/L (ref 136–145)
TSI SER-ACNC: 0.5 MIU/ML (ref 0.36–3.74)

## 2021-02-16 PROCEDURE — 84443 ASSAY THYROID STIM HORMONE: CPT

## 2021-02-16 PROCEDURE — 36415 COLL VENOUS BLD VENIPUNCTURE: CPT

## 2021-02-16 PROCEDURE — 80048 BASIC METABOLIC PNL TOTAL CA: CPT

## 2021-02-17 RX ORDER — LIDOCAINE 5% 5 G/100G
1 CREAM TOPICAL 2 TIMES DAILY PRN
Qty: 113 G | Refills: 0 | Status: SHIPPED | OUTPATIENT
Start: 2021-02-17 | End: 2021-02-24

## 2021-02-18 RX ORDER — TRIAMCINOLONE ACETONIDE 1 MG/ML
LOTION TOPICAL
Qty: 120 ML | Refills: 0 | Status: SHIPPED | OUTPATIENT
Start: 2021-02-18 | End: 2021-02-24

## 2021-02-24 RX ORDER — ESTROGEN,CON/M-PROGEST ACET 0.625-2.5
1 TABLET ORAL DAILY
Qty: 90 TABLET | Refills: 3 | Status: SHIPPED | OUTPATIENT
Start: 2021-02-24 | End: 2021-02-28 | Stop reason: ALTCHOICE

## 2021-02-28 RX ORDER — ESTRADIOL 1 MG/1
1 TABLET ORAL DAILY
Qty: 90 TABLET | Refills: 3 | COMMUNITY
Start: 2021-02-28 | End: 2021-04-08

## 2021-02-28 RX ORDER — MEDROXYPROGESTERONE ACETATE 5 MG/1
5 TABLET ORAL DAILY
Qty: 90 TABLET | Refills: 3 | COMMUNITY
Start: 2021-02-28 | End: 2021-04-08

## 2021-03-12 ENCOUNTER — HOSPITAL ENCOUNTER (OUTPATIENT)
Dept: MAMMOGRAPHY | Age: 56
Discharge: HOME | End: 2021-03-12
Attending: INTERNAL MEDICINE
Payer: COMMERCIAL

## 2021-03-12 ENCOUNTER — HOSPITAL ENCOUNTER (OUTPATIENT)
Dept: BONE DENSITY | Age: 56
Discharge: HOME | End: 2021-03-12
Attending: INTERNAL MEDICINE
Payer: COMMERCIAL

## 2021-03-12 DIAGNOSIS — Z00.00 HEALTHCARE MAINTENANCE: ICD-10-CM

## 2021-03-12 DIAGNOSIS — M85.80 OSTEOPENIA, UNSPECIFIED LOCATION: ICD-10-CM

## 2021-03-12 DIAGNOSIS — Z12.31 BREAST CANCER SCREENING BY MAMMOGRAM: ICD-10-CM

## 2021-03-12 PROCEDURE — 77067 SCR MAMMO BI INCL CAD: CPT | Performed by: INTERNAL MEDICINE

## 2021-03-12 PROCEDURE — 77063 BREAST TOMOSYNTHESIS BI: CPT | Performed by: INTERNAL MEDICINE

## 2021-03-12 PROCEDURE — 77080 DXA BONE DENSITY AXIAL: CPT | Performed by: INTERNAL MEDICINE

## 2021-03-16 ENCOUNTER — TELEPHONE (OUTPATIENT)
Dept: NEUROLOGY | Facility: CLINIC | Age: 56
End: 2021-03-16

## 2021-03-16 DIAGNOSIS — M54.16 LUMBAR RADICULOPATHY: Primary | ICD-10-CM

## 2021-03-16 DIAGNOSIS — M51.9 LUMBAR DISC DISEASE: ICD-10-CM

## 2021-03-16 NOTE — TELEPHONE ENCOUNTER
S/W patient and she stated she would like to have injections done again. Patient stated the last ones done in 10/2020 helped the most up until. Patient is requesting under sedation. Please advise. ..pending orders.

## 2021-03-17 ENCOUNTER — TELEPHONE (OUTPATIENT)
Dept: NEUROLOGY | Facility: CLINIC | Age: 56
End: 2021-03-17

## 2021-03-17 ENCOUNTER — OFFICE VISIT (OUTPATIENT)
Dept: NEUROLOGY | Facility: CLINIC | Age: 56
End: 2021-03-17
Payer: COMMERCIAL

## 2021-03-17 VITALS
SYSTOLIC BLOOD PRESSURE: 126 MMHG | BODY MASS INDEX: 23.57 KG/M2 | RESPIRATION RATE: 20 BRPM | OXYGEN SATURATION: 98 % | WEIGHT: 133 LBS | DIASTOLIC BLOOD PRESSURE: 88 MMHG | HEIGHT: 63 IN | HEART RATE: 93 BPM

## 2021-03-17 DIAGNOSIS — M77.12 LEFT LATERAL EPICONDYLITIS: ICD-10-CM

## 2021-03-17 DIAGNOSIS — M51.9 LUMBAR DISC DISEASE: ICD-10-CM

## 2021-03-17 DIAGNOSIS — M54.16 LUMBAR RADICULOPATHY: ICD-10-CM

## 2021-03-17 DIAGNOSIS — M51.26 LUMBAR HERNIATED DISC: Primary | ICD-10-CM

## 2021-03-17 PROCEDURE — 3079F DIAST BP 80-89 MM HG: CPT | Performed by: PHYSICAL MEDICINE & REHABILITATION

## 2021-03-17 PROCEDURE — 3074F SYST BP LT 130 MM HG: CPT | Performed by: PHYSICAL MEDICINE & REHABILITATION

## 2021-03-17 PROCEDURE — 99214 OFFICE O/P EST MOD 30 MIN: CPT | Performed by: PHYSICAL MEDICINE & REHABILITATION

## 2021-03-17 PROCEDURE — 3008F BODY MASS INDEX DOCD: CPT | Performed by: PHYSICAL MEDICINE & REHABILITATION

## 2021-03-17 NOTE — TELEPHONE ENCOUNTER
Contacted [facility] automated line Confirmation # [id number] to initiate authorization for left L4 and left L5 TFESIs CPT 82784+29939 dx:M54.16 to be done at [facility].   Coverage is active    Status: Approved-pre authorization is not required per health plan    Rout

## 2021-03-17 NOTE — PATIENT INSTRUCTIONS
Plan  I will perform left L4 and L5 TFESI(s). The patient will continue with her home exercise program.    She will do eccentric and concentric wrist extension and flexion exercises for the lateral epicondylitis.     She might jose to have a lateral epi

## 2021-03-17 NOTE — PROGRESS NOTES
Low Back Pain H & P    Chief Complaint: Patient presents with:  Back Pain: Last injections Left L4 and L5 injections 10/09/2020 90% lasting about 2 months and slowly getting worse. Patient rates pain 6/10 and worse by end of day.  Patient states pain raidia at [facility] ENDOSCOPY   • JOHN BIOPSY STEREO NODULE 1 SITE RIGHT (CPT=19081) Right    • METATARSOPHALANGEAL JOINT FUSION Right 12/18/2019    Performed by Jose D Sommer MD at [address], [place] RIGHT      2011 right core   • OTHER KELSI assistive device. .        The patient does live in a home with stairs.     Social Determinants of Health  Financial Resource Strain:       Difficulty of Paying Living Expenses:   Food Insecurity:       Worried About Running Out of Food in the Last Year:     Gait:    Gait: Normal gait   Sit to Stand: no difficulty      Lumbar Spine:    Scoliosis: No scoliosis present     Lumbar Spine Palpation:    Spinous Processes: Non-tender for all Spinous Processes   Z-joints: Non-tender for all Z-joints   SIJ: Non-t

## 2021-03-17 NOTE — TELEPHONE ENCOUNTER
Patient has been scheduled for a  left L4 and left L5 transforaminal epidural steroid injections  on 3/19/2021 at the [facility]. Medications and allergies reviewed.  Patient informed we will need verbal or written authorization from patients Primary Care Physici

## 2021-03-19 ENCOUNTER — OFFICE VISIT (OUTPATIENT)
Dept: SURGERY | Facility: CLINIC | Age: 56
End: 2021-03-19

## 2021-03-19 DIAGNOSIS — M51.9 LUMBAR DISC DISEASE: ICD-10-CM

## 2021-03-19 DIAGNOSIS — M51.26 LUMBAR HERNIATED DISC: ICD-10-CM

## 2021-03-19 DIAGNOSIS — M54.16 LUMBAR RADICULOPATHY: Primary | ICD-10-CM

## 2021-03-19 PROCEDURE — 64483 NJX AA&/STRD TFRM EPI L/S 1: CPT | Performed by: PHYSICAL MEDICINE & REHABILITATION

## 2021-03-19 PROCEDURE — 64484 NJX AA&/STRD TFRM EPI L/S EA: CPT | Performed by: PHYSICAL MEDICINE & REHABILITATION

## 2021-03-23 ENCOUNTER — TELEPHONE (OUTPATIENT)
Dept: INTERNAL MEDICINE CLINIC | Facility: CLINIC | Age: 56
End: 2021-03-23

## 2021-03-23 DIAGNOSIS — Z00.00 HEALTHCARE MAINTENANCE: Primary | ICD-10-CM

## 2021-04-08 RX ORDER — RIZATRIPTAN BENZOATE 10 MG/1
10 TABLET ORAL AS NEEDED
Qty: 12 TABLET | Refills: 5 | Status: SHIPPED | OUTPATIENT
Start: 2021-04-08 | End: 2021-07-05

## 2021-04-08 RX ORDER — ZOLPIDEM TARTRATE 12.5 MG/1
12.5 TABLET, FILM COATED, EXTENDED RELEASE ORAL NIGHTLY
COMMUNITY
Start: 2021-03-03 | End: 2021-06-18

## 2021-04-08 RX ORDER — ESTROGEN,CON/M-PROGEST ACET 0.625-5 MG
1 TABLET ORAL DAILY
Qty: 90 TABLET | Refills: 3 | Status: SHIPPED | OUTPATIENT
Start: 2021-04-08 | End: 2021-06-04

## 2021-04-18 RX ORDER — METOPROLOL SUCCINATE 50 MG/1
50 TABLET, EXTENDED RELEASE ORAL EVERY EVENING
Qty: 90 TABLET | Refills: 3 | Status: SHIPPED | OUTPATIENT
Start: 2021-04-18 | End: 2021-05-12

## 2021-04-19 ENCOUNTER — TELEPHONE (OUTPATIENT)
Dept: INTERNAL MEDICINE CLINIC | Facility: CLINIC | Age: 56
End: 2021-04-19

## 2021-04-19 ENCOUNTER — LAB ENCOUNTER (OUTPATIENT)
Dept: LAB | Age: 56
End: 2021-04-19
Attending: INTERNAL MEDICINE
Payer: COMMERCIAL

## 2021-04-19 ENCOUNTER — TELEPHONE (OUTPATIENT)
Dept: INTERNAL MEDICINE CLINIC | Facility: SKILLED NURSING FACILITY | Age: 56
End: 2021-04-19

## 2021-04-19 DIAGNOSIS — R07.81 PLEURITIC CHEST PAIN: Primary | ICD-10-CM

## 2021-04-19 DIAGNOSIS — R07.81 PLEURITIC CHEST PAIN: ICD-10-CM

## 2021-04-19 PROCEDURE — 85379 FIBRIN DEGRADATION QUANT: CPT

## 2021-04-19 PROCEDURE — 36415 COLL VENOUS BLD VENIPUNCTURE: CPT

## 2021-05-03 ENCOUNTER — TELEPHONE (OUTPATIENT)
Dept: INTERNAL MEDICINE CLINIC | Facility: CLINIC | Age: 56
End: 2021-05-03

## 2021-05-03 DIAGNOSIS — M54.2 CERVICALGIA: Primary | ICD-10-CM

## 2021-05-07 ENCOUNTER — OFFICE VISIT (OUTPATIENT)
Dept: PHYSICAL THERAPY | Facility: HOSPITAL | Age: 56
End: 2021-05-07
Attending: INTERNAL MEDICINE
Payer: COMMERCIAL

## 2021-05-07 DIAGNOSIS — M54.2 CERVICALGIA: ICD-10-CM

## 2021-05-07 PROCEDURE — 97162 PT EVAL MOD COMPLEX 30 MIN: CPT

## 2021-05-07 PROCEDURE — 97110 THERAPEUTIC EXERCISES: CPT

## 2021-05-07 NOTE — PROGRESS NOTES
CERVICAL SPINE EVALUATION:   Referring Physician: Dr. 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 bowel Function: Pt denies    Gait Dysfunction/UE Dysfunction: Pt denies   Surgeries in the Last 6 Mo denies  Left Right   Lateral Neck (C4) WNL WNL   Deltoid Area (C5) WNL WNL   Posterior Thumb (C6) WNL WNL    Posterior Third Digit (C7) WNL WNL   Medial Fifth Digit (C8) WNL WNL   Medial Forearm (T1) WNL WNL   Axilla (T2) WNL WNL       FLEXIBILITY  Muscle is able to garden with 2/10 pain or less to improve leisure activity performance. 3. Ewelina Fam MD will report she is able to complete head turns with driving with 4/69 pain or less to improve safety with driving.     Frequency / Duration: Patien

## 2021-05-12 ENCOUNTER — APPOINTMENT (OUTPATIENT)
Dept: PHYSICAL THERAPY | Facility: HOSPITAL | Age: 56
End: 2021-05-12
Attending: INTERNAL MEDICINE
Payer: COMMERCIAL

## 2021-05-12 PROCEDURE — 97110 THERAPEUTIC EXERCISES: CPT

## 2021-05-12 PROCEDURE — 97140 MANUAL THERAPY 1/> REGIONS: CPT

## 2021-05-12 RX ORDER — METOPROLOL SUCCINATE 100 MG/1
100 TABLET, EXTENDED RELEASE ORAL EVERY EVENING
Qty: 90 TABLET | Refills: 3 | Status: SHIPPED | OUTPATIENT
Start: 2021-05-12 | End: 2021-07-14

## 2021-05-12 RX ORDER — CLOBETASOL PROPIONATE 0.05 G/100ML
1 SHAMPOO TOPICAL
Qty: 236 ML | Refills: 3 | Status: SHIPPED | OUTPATIENT
Start: 2021-05-13 | End: 2021-05-17

## 2021-05-12 NOTE — PROGRESS NOTES
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 Incre

## 2021-05-13 RX ORDER — CLOBETASOL PROPIONATE 0.5 MG/G
CREAM TOPICAL
Qty: 90 G | Refills: 0 | Status: SHIPPED | OUTPATIENT
Start: 2021-05-13 | End: 2021-05-13

## 2021-05-13 RX ORDER — CLOBETASOL PROPIONATE 0.5 MG/G
CREAM TOPICAL
Qty: 90 G | Refills: 3 | Status: SHIPPED | OUTPATIENT
Start: 2021-05-13 | End: 2021-05-17

## 2021-05-14 ENCOUNTER — OFFICE VISIT (OUTPATIENT)
Dept: PHYSICAL THERAPY | Facility: HOSPITAL | Age: 56
End: 2021-05-14
Attending: INTERNAL MEDICINE
Payer: COMMERCIAL

## 2021-05-14 PROCEDURE — 97110 THERAPEUTIC EXERCISES: CPT

## 2021-05-14 PROCEDURE — 97140 MANUAL THERAPY 1/> REGIONS: CPT

## 2021-05-14 NOTE — PROGRESS NOTES
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

## 2021-05-17 RX ORDER — PANTOPRAZOLE SODIUM 20 MG/1
20 TABLET, DELAYED RELEASE ORAL
Qty: 30 TABLET | Refills: 1 | Status: SHIPPED | OUTPATIENT
Start: 2021-05-17 | End: 2021-06-18 | Stop reason: ALTCHOICE

## 2021-05-19 ENCOUNTER — APPOINTMENT (OUTPATIENT)
Dept: PHYSICAL THERAPY | Facility: HOSPITAL | Age: 56
End: 2021-05-19
Attending: INTERNAL MEDICINE
Payer: COMMERCIAL

## 2021-05-21 ENCOUNTER — OFFICE VISIT (OUTPATIENT)
Dept: PHYSICAL THERAPY | Facility: HOSPITAL | Age: 56
End: 2021-05-21
Attending: INTERNAL MEDICINE
Payer: COMMERCIAL

## 2021-05-21 PROCEDURE — 97140 MANUAL THERAPY 1/> REGIONS: CPT

## 2021-05-21 PROCEDURE — 97110 THERAPEUTIC EXERCISES: CPT

## 2021-05-21 NOTE — PROGRESS NOTES
Dx: Cervicalgia (M54.2)         Authorized # of Visits:  3/10         Next MD visit: None scheduled   Fall Risk: Standard      Precautions: N/A           Subjective:  Shannon Vazquez MD  Reports decreased pain post last session with improving overall

## 2021-05-23 RX ORDER — ONDANSETRON 4 MG/1
4 TABLET, FILM COATED ORAL EVERY 8 HOURS PRN
Qty: 20 TABLET | Refills: 2 | Status: SHIPPED | OUTPATIENT
Start: 2021-05-23 | End: 2021-06-04

## 2021-05-26 ENCOUNTER — APPOINTMENT (OUTPATIENT)
Dept: PHYSICAL THERAPY | Facility: HOSPITAL | Age: 56
End: 2021-05-26
Attending: INTERNAL MEDICINE
Payer: COMMERCIAL

## 2021-05-28 ENCOUNTER — APPOINTMENT (OUTPATIENT)
Dept: PHYSICAL THERAPY | Facility: HOSPITAL | Age: 56
End: 2021-05-28
Attending: INTERNAL MEDICINE
Payer: COMMERCIAL

## 2021-06-02 ENCOUNTER — APPOINTMENT (OUTPATIENT)
Dept: PHYSICAL THERAPY | Facility: HOSPITAL | Age: 56
End: 2021-06-02
Attending: INTERNAL MEDICINE
Payer: COMMERCIAL

## 2021-06-03 PROCEDURE — 88342 IMHCHEM/IMCYTCHM 1ST ANTB: CPT | Performed by: INTERNAL MEDICINE

## 2021-06-03 PROCEDURE — 88305 TISSUE EXAM BY PATHOLOGIST: CPT | Performed by: INTERNAL MEDICINE

## 2021-06-04 ENCOUNTER — APPOINTMENT (OUTPATIENT)
Dept: PHYSICAL THERAPY | Facility: HOSPITAL | Age: 56
End: 2021-06-04
Attending: INTERNAL MEDICINE
Payer: COMMERCIAL

## 2021-06-07 ENCOUNTER — LAB ENCOUNTER (OUTPATIENT)
Dept: LAB | Age: 56
End: 2021-06-07
Attending: INTERNAL MEDICINE
Payer: COMMERCIAL

## 2021-06-07 DIAGNOSIS — E03.9 HYPOTHYROIDISM, UNSPECIFIED TYPE: ICD-10-CM

## 2021-06-07 DIAGNOSIS — R10.13 EPIGASTRIC PAIN: ICD-10-CM

## 2021-06-07 DIAGNOSIS — R35.89 POLYURIA: ICD-10-CM

## 2021-06-07 DIAGNOSIS — R39.9 URINARY SYMPTOM OR SIGN: ICD-10-CM

## 2021-06-07 PROBLEM — M54.42 ACUTE BILATERAL LOW BACK PAIN WITH BILATERAL SCIATICA: Status: RESOLVED | Noted: 2020-06-12 | Resolved: 2021-06-07

## 2021-06-07 PROBLEM — M54.41 ACUTE BILATERAL LOW BACK PAIN WITH BILATERAL SCIATICA: Status: RESOLVED | Noted: 2020-06-12 | Resolved: 2021-06-07

## 2021-06-07 PROCEDURE — 84443 ASSAY THYROID STIM HORMONE: CPT

## 2021-06-07 PROCEDURE — 81001 URINALYSIS AUTO W/SCOPE: CPT

## 2021-06-07 PROCEDURE — 80053 COMPREHEN METABOLIC PANEL: CPT

## 2021-06-07 PROCEDURE — 83036 HEMOGLOBIN GLYCOSYLATED A1C: CPT

## 2021-06-07 PROCEDURE — 85025 COMPLETE CBC W/AUTO DIFF WBC: CPT

## 2021-06-07 PROCEDURE — 36415 COLL VENOUS BLD VENIPUNCTURE: CPT

## 2021-06-07 PROCEDURE — 84439 ASSAY OF FREE THYROXINE: CPT

## 2021-06-07 PROCEDURE — 82607 VITAMIN B-12: CPT

## 2021-06-07 PROCEDURE — 87086 URINE CULTURE/COLONY COUNT: CPT

## 2021-06-08 ENCOUNTER — TELEPHONE (OUTPATIENT)
Dept: INTERNAL MEDICINE CLINIC | Facility: CLINIC | Age: 56
End: 2021-06-08

## 2021-06-08 NOTE — TELEPHONE ENCOUNTER
•  Norethindrone-Eth Estradiol (JINTELI) 1-5 MG-MCG Oral Tab, Take 1 tablet by mouth daily. , Disp: 90 tablet, Rfl: 3

## 2021-06-08 NOTE — TELEPHONE ENCOUNTER
Disp Refills Start End    Norethindrone-Eth Estradiol (JINTELI) 1-5 MG-MCG Oral Tab 90 tablet 3 6/4/2021     Sig - Route:  Take 1 tablet by mouth daily. - Oral    Sent to pharmacy as: Norethindrone-Eth Estradiol 1-5 MG-MCG Oral Tablet (Mi Mac)    Notes to

## 2021-06-09 ENCOUNTER — APPOINTMENT (OUTPATIENT)
Dept: PHYSICAL THERAPY | Facility: HOSPITAL | Age: 56
End: 2021-06-09
Attending: INTERNAL MEDICINE
Payer: COMMERCIAL

## 2021-06-10 NOTE — TELEPHONE ENCOUNTER
Called [facility] for status of medication. Rosi pharmacist was requesting additional information. Should patient be on both Prempro and Jinteli, informed Rosi of Prempro discontinued due to side effects of Rash.

## 2021-06-11 ENCOUNTER — APPOINTMENT (OUTPATIENT)
Dept: PHYSICAL THERAPY | Facility: HOSPITAL | Age: 56
End: 2021-06-11
Attending: INTERNAL MEDICINE
Payer: COMMERCIAL

## 2021-06-23 ENCOUNTER — APPOINTMENT (OUTPATIENT)
Dept: PHYSICAL THERAPY | Facility: HOSPITAL | Age: 56
End: 2021-06-23
Attending: INTERNAL MEDICINE
Payer: COMMERCIAL

## 2021-06-25 ENCOUNTER — APPOINTMENT (OUTPATIENT)
Dept: PHYSICAL THERAPY | Facility: HOSPITAL | Age: 56
End: 2021-06-25
Attending: INTERNAL MEDICINE
Payer: COMMERCIAL

## 2021-06-30 ENCOUNTER — APPOINTMENT (OUTPATIENT)
Dept: PHYSICAL THERAPY | Facility: HOSPITAL | Age: 56
End: 2021-06-30
Attending: INTERNAL MEDICINE
Payer: COMMERCIAL

## 2021-07-22 RX ORDER — FLUCONAZOLE 150 MG/1
150 TABLET ORAL WEEKLY
Qty: 4 TABLET | Refills: 5 | Status: SHIPPED | OUTPATIENT
Start: 2021-07-22 | End: 2021-09-02

## 2021-08-03 ENCOUNTER — TELEPHONE (OUTPATIENT)
Dept: PHYSICAL THERAPY | Facility: HOSPITAL | Age: 56
End: 2021-08-03

## 2021-08-04 ENCOUNTER — OFFICE VISIT (OUTPATIENT)
Dept: OCCUPATIONAL MEDICINE | Facility: HOSPITAL | Age: 56
End: 2021-08-04
Attending: INTERNAL MEDICINE
Payer: COMMERCIAL

## 2021-08-04 DIAGNOSIS — M77.12 LEFT LATERAL EPICONDYLITIS: ICD-10-CM

## 2021-08-04 DIAGNOSIS — M75.22 BICEPS TENDINITIS OF LEFT UPPER EXTREMITY: ICD-10-CM

## 2021-08-04 PROCEDURE — 97165 OT EVAL LOW COMPLEX 30 MIN: CPT | Performed by: OCCUPATIONAL THERAPIST

## 2021-08-04 PROCEDURE — 97140 MANUAL THERAPY 1/> REGIONS: CPT | Performed by: OCCUPATIONAL THERAPIST

## 2021-08-04 NOTE — PROGRESS NOTES
OCCUPATIONAL THERAPY UPPER EXTREMITY EVALUATION:   Referring Physician: Dr. Deejay Guadarrama  Date of onset: March, 2021  Diagnosis: Left lateral epicondylitis (M77.12)  Biceps tendinitis of left upper extremity (M75.22) Date of Service: 08/04/21       PATIENT SUM flexion, Cozens and in stressed  strength. She is negative bilaterally for : Resisted wrist extension, Resisted Long finger, Resisted bicep. . Symptoms suggest bilateral medial and lateral  Epicondylitis.  Given the above noted deficits in pain and stren HEP instruction   Technique for STM and forearm stretches                    see below certification required: Yes  I certify the need for these services furnished under this plan of treatment and while under my care.         X______________________________________________ Date________________  Certification From: 77/44/97      To: 10/04/21

## 2021-08-14 RX ORDER — AMOXICILLIN AND CLAVULANATE POTASSIUM 875; 125 MG/1; MG/1
1 TABLET, FILM COATED ORAL 2 TIMES DAILY
Qty: 20 TABLET | Refills: 0 | Status: SHIPPED | OUTPATIENT
Start: 2021-08-14 | End: 2021-08-24

## 2021-08-18 ENCOUNTER — APPOINTMENT (OUTPATIENT)
Dept: OCCUPATIONAL MEDICINE | Facility: HOSPITAL | Age: 56
End: 2021-08-18
Attending: INTERNAL MEDICINE
Payer: COMMERCIAL

## 2021-08-19 NOTE — PROCEDURES
[facility] [facility]    2-LEVEL LUMBAR TRANSFORAMINAL   NAME:  Nya Garcia MD    MR #:    [id number] :  1965     PHYSICIAN:  Karoline Denise        Operative Report    DATE OF PROCEDURE: 3/19/2021   PREOPERATIVE DIAGNOSES: 1.  l each site. After this, the needles were removed. Each site was cleaned. Band-Aids were applied. The patient was transferred to the cart and into [facility]. The patient was given discharge instructions and will follow up in the clinic as scheduled.   Jean Nassar Quality 110: Preventive Care And Screening: Influenza Immunization: Influenza immunization was not ordered or administered, reason not given Detail Level: Detailed

## 2021-08-20 ENCOUNTER — APPOINTMENT (OUTPATIENT)
Dept: OCCUPATIONAL MEDICINE | Facility: HOSPITAL | Age: 56
End: 2021-08-20
Attending: INTERNAL MEDICINE
Payer: COMMERCIAL

## 2021-09-01 ENCOUNTER — APPOINTMENT (OUTPATIENT)
Dept: OCCUPATIONAL MEDICINE | Facility: HOSPITAL | Age: 56
End: 2021-09-01
Attending: INTERNAL MEDICINE
Payer: COMMERCIAL

## 2021-09-03 ENCOUNTER — LAB ENCOUNTER (OUTPATIENT)
Dept: LAB | Facility: HOSPITAL | Age: 56
End: 2021-09-03
Attending: INTERNAL MEDICINE
Payer: COMMERCIAL

## 2021-09-03 ENCOUNTER — OFFICE VISIT (OUTPATIENT)
Dept: OCCUPATIONAL MEDICINE | Facility: HOSPITAL | Age: 56
End: 2021-09-03
Attending: INTERNAL MEDICINE
Payer: COMMERCIAL

## 2021-09-03 DIAGNOSIS — E03.9 HYPOTHYROIDISM, UNSPECIFIED TYPE: ICD-10-CM

## 2021-09-03 LAB
T4 FREE SERPL-MCNC: 1 NG/DL (ref 0.8–1.7)
TSI SER-ACNC: 2.92 MIU/ML (ref 0.36–3.74)

## 2021-09-03 PROCEDURE — 84443 ASSAY THYROID STIM HORMONE: CPT

## 2021-09-03 PROCEDURE — 36415 COLL VENOUS BLD VENIPUNCTURE: CPT

## 2021-09-03 PROCEDURE — 97110 THERAPEUTIC EXERCISES: CPT | Performed by: OCCUPATIONAL THERAPIST

## 2021-09-03 PROCEDURE — 84439 ASSAY OF FREE THYROXINE: CPT

## 2021-09-03 PROCEDURE — 97140 MANUAL THERAPY 1/> REGIONS: CPT | Performed by: OCCUPATIONAL THERAPIST

## 2021-09-03 NOTE — PROGRESS NOTES
Dx: Left Lateral Epicondylitis        Authorized # of Visits:  8         Next MD visit: none scheduled  Fall Risk: standard         Precautions: n/a           Medication Changes since last visit?: No  Subjective: Patient reports more intense pain felt medial elbow will decrease at worst to 1/10. Pt will be independent and compliant with comprehensive HEP to maintain progress achieved in OT.   Patient will demonstrate increase in left stressed  strength in pronation to at least 40 lbs for ease in car

## 2021-09-07 ENCOUNTER — TELEPHONE (OUTPATIENT)
Dept: INTERNAL MEDICINE CLINIC | Facility: CLINIC | Age: 56
End: 2021-09-07

## 2021-09-07 DIAGNOSIS — Z01.84 COVID-19 VIRUS IGG ANTIBODY TEST RESULT UNKNOWN: Primary | ICD-10-CM

## 2021-09-07 DIAGNOSIS — Z92.29 STATUS POST ADMINISTRATION OF ALL DOSES OF COVID-19 VACCINE SERIES: ICD-10-CM

## 2021-09-07 NOTE — TELEPHONE ENCOUNTER
Current Outpatient Medications:   •  fluconazole (DIFLUCAN) 150 MG Oral Tab, Take 1 tablet (150 mg total) by mouth once a week., Disp: 4 tablet, Rfl: 5  •  Rizatriptan Benzoate (MAXALT) 10 MG Oral Tab, Take 1 tablet (10 mg total) by mouth as needed for M

## 2021-09-18 ENCOUNTER — LAB ENCOUNTER (OUTPATIENT)
Dept: LAB | Age: 56
End: 2021-09-18
Attending: INTERNAL MEDICINE
Payer: COMMERCIAL

## 2021-09-18 DIAGNOSIS — Z92.29 STATUS POST ADMINISTRATION OF ALL DOSES OF COVID-19 VACCINE SERIES: ICD-10-CM

## 2021-09-18 LAB — SARS-COV-2 IGG+IGM SERPL QL IA: REACTIVE

## 2021-09-18 PROCEDURE — 86769 SARS-COV-2 COVID-19 ANTIBODY: CPT

## 2021-09-18 PROCEDURE — 36415 COLL VENOUS BLD VENIPUNCTURE: CPT

## 2021-09-20 ENCOUNTER — TELEPHONE (OUTPATIENT)
Dept: INTERNAL MEDICINE CLINIC | Facility: CLINIC | Age: 56
End: 2021-09-20

## 2021-09-20 ENCOUNTER — NURSE ONLY (OUTPATIENT)
Dept: INTERNAL MEDICINE CLINIC | Facility: CLINIC | Age: 56
End: 2021-09-20
Payer: COMMERCIAL

## 2021-09-20 DIAGNOSIS — Z23 NEED FOR VACCINATION: Primary | ICD-10-CM

## 2021-09-20 DIAGNOSIS — Z23 IMMUNIZATION DUE: Primary | ICD-10-CM

## 2021-09-20 PROCEDURE — 90686 IIV4 VACC NO PRSV 0.5 ML IM: CPT | Performed by: INTERNAL MEDICINE

## 2021-09-20 PROCEDURE — 90471 IMMUNIZATION ADMIN: CPT | Performed by: INTERNAL MEDICINE

## 2021-09-20 NOTE — PROGRESS NOTES
Patient is here today for a scheduled nurse visit. Patient name, , and allergies verified. Order verified in system. Patient is to receive flu injection. Patient received injection in left deltoid via IM injection.  Patient tolerated injection well with

## 2021-09-24 ENCOUNTER — TELEPHONE (OUTPATIENT)
Dept: INTERNAL MEDICINE CLINIC | Facility: CLINIC | Age: 56
End: 2021-09-24

## 2021-09-24 DIAGNOSIS — Z91.89 AT INCREASED RISK OF EXPOSURE TO COVID-19 VIRUS: ICD-10-CM

## 2021-09-24 DIAGNOSIS — Z20.822 CLOSE EXPOSURE TO COVID-19 VIRUS: ICD-10-CM

## 2021-09-24 DIAGNOSIS — G47.00 INSOMNIA, UNSPECIFIED TYPE: Primary | ICD-10-CM

## 2021-09-24 NOTE — TELEPHONE ENCOUNTER
Prior authorization request received through [employer]    Prior authorization for Zolpidem completed w/ [employer] on cover my meds Key: [id number], turn around time 3-5 days.

## 2021-10-06 ENCOUNTER — OFFICE VISIT (OUTPATIENT)
Dept: OCCUPATIONAL MEDICINE | Facility: HOSPITAL | Age: 56
End: 2021-10-06
Attending: INTERNAL MEDICINE
Payer: COMMERCIAL

## 2021-10-06 PROCEDURE — 97035 APP MDLTY 1+ULTRASOUND EA 15: CPT | Performed by: OCCUPATIONAL THERAPIST

## 2021-10-06 PROCEDURE — 97110 THERAPEUTIC EXERCISES: CPT | Performed by: OCCUPATIONAL THERAPIST

## 2021-10-06 PROCEDURE — 97140 MANUAL THERAPY 1/> REGIONS: CPT | Performed by: OCCUPATIONAL THERAPIST

## 2021-10-06 NOTE — PROGRESS NOTES
Dx: Left Lateral Epicondylitis        Authorized # of Visits:  8         Next MD visit: none scheduled  Fall Risk: standard         Precautions: n/a           Medication Changes since last visit?: No  Subjective: Patient reports significant less pain i in left forearm extensors, patient main complaint- pain in 1st and 2nd dorsal interossei, attributed to using mouse and over use of IF. Treated with stretches and ultrasound.  Given less pain reported in left forearm extensors upgraded UE strengthening exer

## 2021-10-08 ENCOUNTER — IMMUNIZATION (OUTPATIENT)
Dept: LAB | Facility: HOSPITAL | Age: 56
End: 2021-10-08
Attending: EMERGENCY MEDICINE
Payer: COMMERCIAL

## 2021-10-08 ENCOUNTER — APPOINTMENT (OUTPATIENT)
Dept: OCCUPATIONAL MEDICINE | Facility: HOSPITAL | Age: 56
End: 2021-10-08
Attending: INTERNAL MEDICINE
Payer: COMMERCIAL

## 2021-10-08 DIAGNOSIS — Z23 NEED FOR VACCINATION: Primary | ICD-10-CM

## 2021-10-08 PROCEDURE — 0004A SARSCOV2 VAC 30MCG/0.3ML IM: CPT

## 2021-10-08 PROCEDURE — 0003A SARSCOV2 VAC 30MCG/0.3ML IM: CPT

## 2021-10-13 ENCOUNTER — APPOINTMENT (OUTPATIENT)
Dept: OCCUPATIONAL MEDICINE | Facility: HOSPITAL | Age: 56
End: 2021-10-13
Attending: INTERNAL MEDICINE
Payer: COMMERCIAL

## 2021-10-15 ENCOUNTER — APPOINTMENT (OUTPATIENT)
Dept: OCCUPATIONAL MEDICINE | Facility: HOSPITAL | Age: 56
End: 2021-10-15
Attending: INTERNAL MEDICINE
Payer: COMMERCIAL

## 2021-10-20 ENCOUNTER — APPOINTMENT (OUTPATIENT)
Dept: OCCUPATIONAL MEDICINE | Facility: HOSPITAL | Age: 56
End: 2021-10-20
Attending: INTERNAL MEDICINE
Payer: COMMERCIAL

## 2021-10-22 ENCOUNTER — APPOINTMENT (OUTPATIENT)
Dept: OCCUPATIONAL MEDICINE | Facility: HOSPITAL | Age: 56
End: 2021-10-22
Attending: INTERNAL MEDICINE
Payer: COMMERCIAL

## 2021-12-10 RX ORDER — CITALOPRAM 20 MG/1
20 TABLET ORAL DAILY
Qty: 90 TABLET | Refills: 3 | Status: SHIPPED | OUTPATIENT
Start: 2021-12-10

## 2021-12-10 RX ORDER — NORETHINDRONE ACETATE AND ETHINYL ESTRADIOL 1; 5 MG/1; UG/1
1 TABLET ORAL DAILY
Qty: 90 TABLET | Refills: 3 | Status: SHIPPED | OUTPATIENT
Start: 2021-12-10

## 2022-04-09 ENCOUNTER — HOSPITAL ENCOUNTER (OUTPATIENT)
Dept: MAMMOGRAPHY | Age: 57
Discharge: HOME | End: 2022-04-09
Attending: INTERNAL MEDICINE
Payer: COMMERCIAL

## 2022-04-09 DIAGNOSIS — Z12.31 BREAST CANCER SCREENING BY MAMMOGRAM: ICD-10-CM

## 2022-04-09 PROCEDURE — 77063 BREAST TOMOSYNTHESIS BI: CPT | Performed by: INTERNAL MEDICINE

## 2022-04-09 PROCEDURE — 77067 SCR MAMMO BI INCL CAD: CPT | Performed by: INTERNAL MEDICINE

## 2022-06-03 ENCOUNTER — NURSE ONLY (OUTPATIENT)
Dept: INTERNAL MEDICINE CLINIC | Facility: HOSPITAL | Age: 57
End: 2022-06-03
Attending: EMERGENCY MEDICINE

## 2022-06-03 DIAGNOSIS — Z00.00 WELLNESS EXAMINATION: Primary | ICD-10-CM

## 2022-06-03 LAB
RUBV IGG SER QL: POSITIVE
RUBV IGG SER-ACNC: >500 IU/ML (ref 10–?)

## 2022-06-03 PROCEDURE — 86787 VARICELLA-ZOSTER ANTIBODY: CPT

## 2022-06-03 PROCEDURE — 86762 RUBELLA ANTIBODY: CPT

## 2022-06-03 PROCEDURE — 86480 TB TEST CELL IMMUN MEASURE: CPT

## 2022-06-03 PROCEDURE — 86735 MUMPS ANTIBODY: CPT

## 2022-06-03 PROCEDURE — 86765 RUBEOLA ANTIBODY: CPT

## 2022-06-06 LAB
M TB IFN-G CD4+ T-CELLS BLD-ACNC: 0.04 IU/ML
M TB TUBERC IFN-G BLD QL: NEGATIVE
M TB TUBERC IGNF/MITOGEN IGNF CONTROL: >10 IU/ML
MEV IGG SER-ACNC: >300 AU/ML (ref 16.5–?)
MUV IGG SER IA-ACNC: 155 AU/ML (ref 11–?)
QFT TB1 AG MINUS NIL: 0.02 IU/ML
QFT TB2 AG MINUS NIL: 0.02 IU/ML
VZV IGG SER IA-ACNC: >4000 (ref 165–?)

## 2022-08-02 RX ORDER — METOPROLOL SUCCINATE 50 MG/1
50 TABLET, EXTENDED RELEASE ORAL EVERY EVENING
Qty: 90 TABLET | Refills: 3 | Status: SHIPPED | OUTPATIENT
Start: 2022-08-02

## 2022-08-29 ENCOUNTER — LAB ENCOUNTER (OUTPATIENT)
Dept: LAB | Age: 57
End: 2022-08-29
Attending: INTERNAL MEDICINE
Payer: COMMERCIAL

## 2022-08-29 DIAGNOSIS — Z00.00 ROUTINE HEALTH MAINTENANCE: ICD-10-CM

## 2022-08-29 LAB
ALBUMIN SERPL-MCNC: 3.4 G/DL (ref 3.4–5)
ALBUMIN/GLOB SERPL: 0.9 {RATIO} (ref 1–2)
ALP LIVER SERPL-CCNC: 56 U/L
ALT SERPL-CCNC: 39 U/L
ANION GAP SERPL CALC-SCNC: 6 MMOL/L (ref 0–18)
AST SERPL-CCNC: 26 U/L (ref 15–37)
BASOPHILS # BLD AUTO: 0.02 X10(3) UL (ref 0–0.2)
BASOPHILS NFR BLD AUTO: 0.3 %
BILIRUB SERPL-MCNC: 0.6 MG/DL (ref 0.1–2)
BUN BLD-MCNC: 11 MG/DL (ref 7–18)
BUN/CREAT SERPL: 13.8 (ref 10–20)
CALCIUM BLD-MCNC: 8.8 MG/DL (ref 8.5–10.1)
CHLORIDE SERPL-SCNC: 105 MMOL/L (ref 98–112)
CHOLEST SERPL-MCNC: 200 MG/DL (ref ?–200)
CO2 SERPL-SCNC: 28 MMOL/L (ref 21–32)
CREAT BLD-MCNC: 0.8 MG/DL
DEPRECATED RDW RBC AUTO: 39.6 FL (ref 35.1–46.3)
EOSINOPHIL # BLD AUTO: 0.18 X10(3) UL (ref 0–0.7)
EOSINOPHIL NFR BLD AUTO: 2.8 %
ERYTHROCYTE [DISTWIDTH] IN BLOOD BY AUTOMATED COUNT: 12.4 % (ref 11–15)
FASTING PATIENT LIPID ANSWER: NO
FASTING STATUS PATIENT QL REPORTED: NO
GFR SERPLBLD BASED ON 1.73 SQ M-ARVRAT: 85 ML/MIN/1.73M2 (ref 60–?)
GLOBULIN PLAS-MCNC: 3.6 G/DL (ref 2.8–4.4)
GLUCOSE BLD-MCNC: 91 MG/DL (ref 70–99)
HCT VFR BLD AUTO: 40.4 %
HDLC SERPL-MCNC: 53 MG/DL (ref 40–59)
HGB BLD-MCNC: 13.5 G/DL
IMM GRANULOCYTES # BLD AUTO: 0.02 X10(3) UL (ref 0–1)
IMM GRANULOCYTES NFR BLD: 0.3 %
LDLC SERPL CALC-MCNC: 121 MG/DL (ref ?–100)
LYMPHOCYTES # BLD AUTO: 1.41 X10(3) UL (ref 1–4)
LYMPHOCYTES NFR BLD AUTO: 21.7 %
MCH RBC QN AUTO: 29.3 PG (ref 26–34)
MCHC RBC AUTO-ENTMCNC: 33.4 G/DL (ref 31–37)
MCV RBC AUTO: 87.8 FL
MONOCYTES # BLD AUTO: 0.46 X10(3) UL (ref 0.1–1)
MONOCYTES NFR BLD AUTO: 7.1 %
NEUTROPHILS # BLD AUTO: 4.42 X10 (3) UL (ref 1.5–7.7)
NEUTROPHILS # BLD AUTO: 4.42 X10(3) UL (ref 1.5–7.7)
NEUTROPHILS NFR BLD AUTO: 67.8 %
NONHDLC SERPL-MCNC: 147 MG/DL (ref ?–130)
OSMOLALITY SERPL CALC.SUM OF ELEC: 287 MOSM/KG (ref 275–295)
PLATELET # BLD AUTO: 239 10(3)UL (ref 150–450)
POTASSIUM SERPL-SCNC: 4.4 MMOL/L (ref 3.5–5.1)
PROT SERPL-MCNC: 7 G/DL (ref 6.4–8.2)
RBC # BLD AUTO: 4.6 X10(6)UL
SODIUM SERPL-SCNC: 139 MMOL/L (ref 136–145)
T4 FREE SERPL-MCNC: 0.9 NG/DL (ref 0.8–1.7)
TRIGL SERPL-MCNC: 147 MG/DL (ref 30–149)
TSI SER-ACNC: 3.85 MIU/ML (ref 0.36–3.74)
VLDLC SERPL CALC-MCNC: 26 MG/DL (ref 0–30)
WBC # BLD AUTO: 6.5 X10(3) UL (ref 4–11)

## 2022-08-29 PROCEDURE — 84443 ASSAY THYROID STIM HORMONE: CPT

## 2022-08-29 PROCEDURE — 85025 COMPLETE CBC W/AUTO DIFF WBC: CPT

## 2022-08-29 PROCEDURE — 36415 COLL VENOUS BLD VENIPUNCTURE: CPT

## 2022-08-29 PROCEDURE — 80053 COMPREHEN METABOLIC PANEL: CPT

## 2022-08-29 PROCEDURE — 84439 ASSAY OF FREE THYROXINE: CPT

## 2022-08-29 PROCEDURE — 80061 LIPID PANEL: CPT

## 2022-09-29 DIAGNOSIS — Z20.822 CLOSE EXPOSURE TO COVID-19 VIRUS: Primary | ICD-10-CM

## 2022-09-29 DIAGNOSIS — J00 ACUTE RHINITIS: ICD-10-CM

## 2022-10-20 ENCOUNTER — IMMUNIZATION (OUTPATIENT)
Dept: LAB | Facility: HOSPITAL | Age: 57
End: 2022-10-20
Attending: PREVENTIVE MEDICINE
Payer: COMMERCIAL

## 2022-10-20 DIAGNOSIS — Z23 NEED FOR VACCINATION: Primary | ICD-10-CM

## 2022-10-20 PROCEDURE — 90471 IMMUNIZATION ADMIN: CPT

## 2022-10-23 ENCOUNTER — IMMUNIZATION (OUTPATIENT)
Dept: LAB | Age: 57
End: 2022-10-23
Attending: EMERGENCY MEDICINE
Payer: COMMERCIAL

## 2022-10-23 DIAGNOSIS — Z23 NEED FOR VACCINATION: Primary | ICD-10-CM

## 2022-10-23 PROCEDURE — 0124A SARSCOV2 VAC BVL 30MCG/0.3ML: CPT

## 2022-12-10 ENCOUNTER — LAB ENCOUNTER (OUTPATIENT)
Dept: LAB | Age: 57
End: 2022-12-10
Attending: INTERNAL MEDICINE
Payer: COMMERCIAL

## 2022-12-10 DIAGNOSIS — J00 ACUTE RHINITIS: ICD-10-CM

## 2022-12-11 LAB — SARS-COV-2 RNA RESP QL NAA+PROBE: NOT DETECTED

## 2023-01-31 ENCOUNTER — LAB ENCOUNTER (OUTPATIENT)
Dept: LAB | Age: 58
End: 2023-01-31
Attending: INTERNAL MEDICINE
Payer: COMMERCIAL

## 2023-01-31 DIAGNOSIS — E03.9 HYPOTHYROIDISM, UNSPECIFIED TYPE: ICD-10-CM

## 2023-01-31 DIAGNOSIS — E53.8 VITAMIN B12 DEFICIENCY: ICD-10-CM

## 2023-01-31 LAB
T4 FREE SERPL-MCNC: 1 NG/DL (ref 0.8–1.7)
TSI SER-ACNC: 8.85 MIU/ML (ref 0.36–3.74)
VIT B12 SERPL-MCNC: 389 PG/ML (ref 193–986)

## 2023-01-31 PROCEDURE — 82607 VITAMIN B-12: CPT

## 2023-01-31 PROCEDURE — 84439 ASSAY OF FREE THYROXINE: CPT

## 2023-01-31 PROCEDURE — 36415 COLL VENOUS BLD VENIPUNCTURE: CPT

## 2023-01-31 PROCEDURE — 84443 ASSAY THYROID STIM HORMONE: CPT

## 2023-02-27 ENCOUNTER — OFFICE VISIT (OUTPATIENT)
Dept: PHYSICAL MEDICINE AND REHAB | Facility: CLINIC | Age: 58
End: 2023-02-27
Payer: COMMERCIAL

## 2023-02-27 VITALS — HEIGHT: 63 IN | WEIGHT: 136 LBS | BODY MASS INDEX: 24.1 KG/M2

## 2023-02-27 DIAGNOSIS — M54.2 NECK PAIN ON RIGHT SIDE: Primary | ICD-10-CM

## 2023-02-27 DIAGNOSIS — G89.29 CHRONIC PAIN OF BOTH SHOULDERS: ICD-10-CM

## 2023-02-27 DIAGNOSIS — M79.18 MYOFASCIAL PAIN: ICD-10-CM

## 2023-02-27 DIAGNOSIS — M25.511 CHRONIC PAIN OF BOTH SHOULDERS: ICD-10-CM

## 2023-02-27 DIAGNOSIS — M25.512 CHRONIC PAIN OF BOTH SHOULDERS: ICD-10-CM

## 2023-02-27 PROBLEM — M54.12 CERVICAL RADICULOPATHY: Status: RESOLVED | Noted: 2023-02-27 | Resolved: 2023-02-27

## 2023-02-27 PROBLEM — M54.12 CERVICAL RADICULOPATHY: Status: ACTIVE | Noted: 2023-02-27

## 2023-02-27 RX ORDER — LIDOCAINE HYDROCHLORIDE 10 MG/ML
5 INJECTION, SOLUTION INFILTRATION; PERINEURAL ONCE
Status: COMPLETED | OUTPATIENT
Start: 2023-02-27 | End: 2023-02-27

## 2023-02-28 ENCOUNTER — TELEPHONE (OUTPATIENT)
Dept: PHYSICAL MEDICINE AND REHAB | Facility: CLINIC | Age: 58
End: 2023-02-28

## 2023-02-28 NOTE — PATIENT INSTRUCTIONS
Plan  I did a right upper trap[ezius trigger pint injection in the office today. (see procedure note)     She will get into the PT for the cervical spine. She will get cervical spine x-rays. She will follow up in 3 months or sooner if needed.

## 2023-02-28 NOTE — PROCEDURES
I did a right upper trapezius trigger point injection(s) in the office today. The point of maximal tenderness was identified and a taniya was placed on the patient's skin. The skin was cleaned with alcohol swabs x 3. A 27 gauge needle was inserted and directed to the point of maximal tenderness. When it was engaged, aspiration was performed and the patient was injected with 0.5 ml of 1% lidocaine without epinephrine to break up the trigger point. This was followed by dry needling. The process was repeated until there was no more pain/tenderness. The needle was removed. The patient tolerated the procedure well. I used about 5 ml of 1% lidocaine without epinephrine.

## 2023-03-03 ENCOUNTER — OFFICE VISIT (OUTPATIENT)
Dept: PODIATRY CLINIC | Facility: CLINIC | Age: 58
End: 2023-03-03

## 2023-03-03 DIAGNOSIS — M20.21 ACQUIRED HALLUX RIGIDUS OF RIGHT FOOT: Primary | ICD-10-CM

## 2023-03-03 DIAGNOSIS — M20.5X2 HALLUX LIMITUS OF LEFT FOOT: ICD-10-CM

## 2023-03-03 PROCEDURE — 99203 OFFICE O/P NEW LOW 30 MIN: CPT | Performed by: PODIATRIST

## 2023-04-17 ENCOUNTER — TELEPHONE (OUTPATIENT)
Dept: PHYSICAL THERAPY | Facility: HOSPITAL | Age: 58
End: 2023-04-17

## 2023-04-19 ENCOUNTER — OFFICE VISIT (OUTPATIENT)
Dept: PHYSICAL THERAPY | Facility: HOSPITAL | Age: 58
End: 2023-04-19
Attending: PHYSICAL MEDICINE & REHABILITATION
Payer: COMMERCIAL

## 2023-04-19 DIAGNOSIS — M25.512 CHRONIC PAIN OF BOTH SHOULDERS: ICD-10-CM

## 2023-04-19 DIAGNOSIS — M79.18 MYOFASCIAL PAIN: ICD-10-CM

## 2023-04-19 DIAGNOSIS — M25.511 CHRONIC PAIN OF BOTH SHOULDERS: ICD-10-CM

## 2023-04-19 DIAGNOSIS — G89.29 CHRONIC PAIN OF BOTH SHOULDERS: ICD-10-CM

## 2023-04-19 DIAGNOSIS — M54.2 NECK PAIN ON RIGHT SIDE: ICD-10-CM

## 2023-04-19 PROCEDURE — 97110 THERAPEUTIC EXERCISES: CPT

## 2023-04-19 PROCEDURE — 97162 PT EVAL MOD COMPLEX 30 MIN: CPT

## 2023-04-26 ENCOUNTER — APPOINTMENT (OUTPATIENT)
Dept: PHYSICAL THERAPY | Facility: HOSPITAL | Age: 58
End: 2023-04-26
Attending: PHYSICAL MEDICINE & REHABILITATION
Payer: COMMERCIAL

## 2023-04-28 ENCOUNTER — LAB ENCOUNTER (OUTPATIENT)
Dept: LAB | Facility: HOSPITAL | Age: 58
End: 2023-04-28
Attending: INTERNAL MEDICINE
Payer: COMMERCIAL

## 2023-04-28 ENCOUNTER — OFFICE VISIT (OUTPATIENT)
Dept: PHYSICAL THERAPY | Facility: HOSPITAL | Age: 58
End: 2023-04-28
Attending: PHYSICAL MEDICINE & REHABILITATION
Payer: COMMERCIAL

## 2023-04-28 ENCOUNTER — HOSPITAL ENCOUNTER (OUTPATIENT)
Dept: GENERAL RADIOLOGY | Facility: HOSPITAL | Age: 58
Discharge: HOME | End: 2023-04-28
Attending: PHYSICAL MEDICINE & REHABILITATION
Payer: COMMERCIAL

## 2023-04-28 ENCOUNTER — HOSPITAL ENCOUNTER (OUTPATIENT)
Dept: BONE DENSITY | Facility: HOSPITAL | Age: 58
Discharge: HOME | End: 2023-04-28
Attending: INTERNAL MEDICINE
Payer: COMMERCIAL

## 2023-04-28 DIAGNOSIS — M54.2 NECK PAIN ON RIGHT SIDE: ICD-10-CM

## 2023-04-28 DIAGNOSIS — Z13.820 SCREENING FOR OSTEOPOROSIS: ICD-10-CM

## 2023-04-28 DIAGNOSIS — M79.18 MYOFASCIAL PAIN: ICD-10-CM

## 2023-04-28 DIAGNOSIS — M85.80 OSTEOPENIA, UNSPECIFIED LOCATION: ICD-10-CM

## 2023-04-28 DIAGNOSIS — Z00.00 ROUTINE HEALTH MAINTENANCE: ICD-10-CM

## 2023-04-28 LAB
ALBUMIN SERPL-MCNC: 3.4 G/DL (ref 3.4–5)
ALBUMIN/GLOB SERPL: 1 {RATIO} (ref 1–2)
ALP LIVER SERPL-CCNC: 57 U/L
ALT SERPL-CCNC: 21 U/L
ANION GAP SERPL CALC-SCNC: 4 MMOL/L (ref 0–18)
AST SERPL-CCNC: 15 U/L (ref 15–37)
BASOPHILS # BLD AUTO: 0.02 X10(3) UL (ref 0–0.2)
BASOPHILS NFR BLD AUTO: 0.3 %
BILIRUB SERPL-MCNC: 0.6 MG/DL (ref 0.1–2)
BUN BLD-MCNC: 12 MG/DL (ref 7–18)
BUN/CREAT SERPL: 15 (ref 10–20)
CALCIUM BLD-MCNC: 8.7 MG/DL (ref 8.5–10.1)
CHLORIDE SERPL-SCNC: 109 MMOL/L (ref 98–112)
CHOLEST SERPL-MCNC: 161 MG/DL (ref ?–200)
CO2 SERPL-SCNC: 27 MMOL/L (ref 21–32)
CREAT BLD-MCNC: 0.8 MG/DL
DEPRECATED RDW RBC AUTO: 36.6 FL (ref 35.1–46.3)
EOSINOPHIL # BLD AUTO: 0.19 X10(3) UL (ref 0–0.7)
EOSINOPHIL NFR BLD AUTO: 2.8 %
ERYTHROCYTE [DISTWIDTH] IN BLOOD BY AUTOMATED COUNT: 11.9 % (ref 11–15)
FASTING PATIENT LIPID ANSWER: NO
FASTING STATUS PATIENT QL REPORTED: NO
GFR SERPLBLD BASED ON 1.73 SQ M-ARVRAT: 85 ML/MIN/1.73M2 (ref 60–?)
GLOBULIN PLAS-MCNC: 3.5 G/DL (ref 2.8–4.4)
GLUCOSE BLD-MCNC: 63 MG/DL (ref 70–99)
HCT VFR BLD AUTO: 40.7 %
HDLC SERPL-MCNC: 59 MG/DL (ref 40–59)
HGB BLD-MCNC: 14.1 G/DL
IMM GRANULOCYTES # BLD AUTO: 0.01 X10(3) UL (ref 0–1)
IMM GRANULOCYTES NFR BLD: 0.1 %
LDLC SERPL CALC-MCNC: 87 MG/DL (ref ?–100)
LYMPHOCYTES # BLD AUTO: 1.38 X10(3) UL (ref 1–4)
LYMPHOCYTES NFR BLD AUTO: 20.1 %
MCH RBC QN AUTO: 29.4 PG (ref 26–34)
MCHC RBC AUTO-ENTMCNC: 34.6 G/DL (ref 31–37)
MCV RBC AUTO: 85 FL
MONOCYTES # BLD AUTO: 0.51 X10(3) UL (ref 0.1–1)
MONOCYTES NFR BLD AUTO: 7.4 %
NEUTROPHILS # BLD AUTO: 4.75 X10 (3) UL (ref 1.5–7.7)
NEUTROPHILS # BLD AUTO: 4.75 X10(3) UL (ref 1.5–7.7)
NEUTROPHILS NFR BLD AUTO: 69.3 %
NONHDLC SERPL-MCNC: 102 MG/DL (ref ?–130)
OSMOLALITY SERPL CALC.SUM OF ELEC: 288 MOSM/KG (ref 275–295)
PLATELET # BLD AUTO: 230 10(3)UL (ref 150–450)
POTASSIUM SERPL-SCNC: 4.2 MMOL/L (ref 3.5–5.1)
PROT SERPL-MCNC: 6.9 G/DL (ref 6.4–8.2)
RBC # BLD AUTO: 4.79 X10(6)UL
SODIUM SERPL-SCNC: 140 MMOL/L (ref 136–145)
TRIGL SERPL-MCNC: 82 MG/DL (ref 30–149)
TSI SER-ACNC: 1.29 MIU/ML (ref 0.36–3.74)
VLDLC SERPL CALC-MCNC: 13 MG/DL (ref 0–30)
WBC # BLD AUTO: 6.9 X10(3) UL (ref 4–11)

## 2023-04-28 PROCEDURE — 97140 MANUAL THERAPY 1/> REGIONS: CPT

## 2023-04-28 PROCEDURE — 77080 DXA BONE DENSITY AXIAL: CPT | Performed by: INTERNAL MEDICINE

## 2023-04-28 PROCEDURE — 72050 X-RAY EXAM NECK SPINE 4/5VWS: CPT | Performed by: PHYSICAL MEDICINE & REHABILITATION

## 2023-04-28 PROCEDURE — 36415 COLL VENOUS BLD VENIPUNCTURE: CPT

## 2023-04-28 PROCEDURE — 97112 NEUROMUSCULAR REEDUCATION: CPT

## 2023-04-28 PROCEDURE — 80061 LIPID PANEL: CPT

## 2023-04-28 PROCEDURE — 85025 COMPLETE CBC W/AUTO DIFF WBC: CPT

## 2023-04-28 PROCEDURE — 97110 THERAPEUTIC EXERCISES: CPT

## 2023-04-28 PROCEDURE — 80053 COMPREHEN METABOLIC PANEL: CPT

## 2023-04-28 PROCEDURE — 84443 ASSAY THYROID STIM HORMONE: CPT

## 2023-05-01 ENCOUNTER — APPOINTMENT (OUTPATIENT)
Dept: PHYSICAL THERAPY | Facility: HOSPITAL | Age: 58
End: 2023-05-01
Attending: PHYSICAL MEDICINE & REHABILITATION
Payer: COMMERCIAL

## 2023-05-03 ENCOUNTER — OFFICE VISIT (OUTPATIENT)
Dept: PHYSICAL THERAPY | Facility: HOSPITAL | Age: 58
End: 2023-05-03
Attending: PHYSICAL MEDICINE & REHABILITATION
Payer: COMMERCIAL

## 2023-05-03 PROCEDURE — 97140 MANUAL THERAPY 1/> REGIONS: CPT

## 2023-05-03 PROCEDURE — 97112 NEUROMUSCULAR REEDUCATION: CPT

## 2023-05-10 ENCOUNTER — HOSPITAL ENCOUNTER (OUTPATIENT)
Dept: MAMMOGRAPHY | Facility: HOSPITAL | Age: 58
Discharge: HOME | End: 2023-05-10
Attending: INTERNAL MEDICINE
Payer: COMMERCIAL

## 2023-05-10 ENCOUNTER — OFFICE VISIT (OUTPATIENT)
Dept: PHYSICAL THERAPY | Facility: HOSPITAL | Age: 58
End: 2023-05-10
Attending: PHYSICAL MEDICINE & REHABILITATION
Payer: COMMERCIAL

## 2023-05-10 DIAGNOSIS — Z12.31 BREAST CANCER SCREENING BY MAMMOGRAM: ICD-10-CM

## 2023-05-10 PROCEDURE — 77063 BREAST TOMOSYNTHESIS BI: CPT | Performed by: INTERNAL MEDICINE

## 2023-05-10 PROCEDURE — 97112 NEUROMUSCULAR REEDUCATION: CPT

## 2023-05-10 PROCEDURE — 77067 SCR MAMMO BI INCL CAD: CPT | Performed by: INTERNAL MEDICINE

## 2023-05-10 PROCEDURE — 97140 MANUAL THERAPY 1/> REGIONS: CPT

## 2023-05-12 ENCOUNTER — OFFICE VISIT (OUTPATIENT)
Dept: OBGYN CLINIC | Facility: CLINIC | Age: 58
End: 2023-05-12

## 2023-05-12 VITALS
WEIGHT: 139 LBS | DIASTOLIC BLOOD PRESSURE: 75 MMHG | HEART RATE: 64 BPM | BODY MASS INDEX: 25 KG/M2 | SYSTOLIC BLOOD PRESSURE: 117 MMHG

## 2023-05-12 DIAGNOSIS — Z76.0 MEDICATION REFILL: ICD-10-CM

## 2023-05-12 DIAGNOSIS — Z12.4 SCREENING FOR MALIGNANT NEOPLASM OF CERVIX: ICD-10-CM

## 2023-05-12 DIAGNOSIS — Z01.419 ENCOUNTER FOR GYNECOLOGICAL EXAMINATION WITHOUT ABNORMAL FINDING: Primary | ICD-10-CM

## 2023-05-12 PROCEDURE — 3074F SYST BP LT 130 MM HG: CPT | Performed by: OBSTETRICS & GYNECOLOGY

## 2023-05-12 PROCEDURE — 3078F DIAST BP <80 MM HG: CPT | Performed by: OBSTETRICS & GYNECOLOGY

## 2023-05-12 PROCEDURE — 99396 PREV VISIT EST AGE 40-64: CPT | Performed by: OBSTETRICS & GYNECOLOGY

## 2023-05-12 RX ORDER — NORETHINDRONE ACETATE AND ETHINYL ESTRADIOL 1; 5 MG/1; UG/1
1 TABLET ORAL DAILY
Qty: 100 TABLET | Refills: 3 | Status: SHIPPED | OUTPATIENT
Start: 2023-05-12

## 2023-05-15 LAB — HPV I/H RISK 1 DNA SPEC QL NAA+PROBE: NEGATIVE

## 2023-05-17 ENCOUNTER — OFFICE VISIT (OUTPATIENT)
Dept: PHYSICAL THERAPY | Facility: HOSPITAL | Age: 58
End: 2023-05-17
Attending: PHYSICAL MEDICINE & REHABILITATION
Payer: COMMERCIAL

## 2023-05-17 PROCEDURE — 97140 MANUAL THERAPY 1/> REGIONS: CPT

## 2023-05-17 PROCEDURE — 97112 NEUROMUSCULAR REEDUCATION: CPT

## 2023-05-17 PROCEDURE — 97110 THERAPEUTIC EXERCISES: CPT

## 2023-05-24 ENCOUNTER — APPOINTMENT (OUTPATIENT)
Dept: PHYSICAL THERAPY | Facility: HOSPITAL | Age: 58
End: 2023-05-24
Attending: PHYSICAL MEDICINE & REHABILITATION
Payer: COMMERCIAL

## 2023-05-31 ENCOUNTER — APPOINTMENT (OUTPATIENT)
Dept: PHYSICAL THERAPY | Facility: HOSPITAL | Age: 58
End: 2023-05-31
Attending: PHYSICAL MEDICINE & REHABILITATION
Payer: COMMERCIAL

## 2023-06-02 ENCOUNTER — OFFICE VISIT (OUTPATIENT)
Dept: OTOLARYNGOLOGY | Facility: CLINIC | Age: 58
End: 2023-06-02

## 2023-06-02 ENCOUNTER — OFFICE VISIT (OUTPATIENT)
Dept: AUDIOLOGY | Facility: CLINIC | Age: 58
End: 2023-06-02

## 2023-06-02 DIAGNOSIS — H90.3 SENSORINEURAL HEARING LOSS (SNHL), BILATERAL: Primary | ICD-10-CM

## 2023-06-02 DIAGNOSIS — H91.90 HEARING LOSS, UNSPECIFIED HEARING LOSS TYPE, UNSPECIFIED LATERALITY: Primary | ICD-10-CM

## 2023-06-02 DIAGNOSIS — H90.6 MIXED HEARING LOSS, BILATERAL: ICD-10-CM

## 2023-06-02 PROCEDURE — 92557 COMPREHENSIVE HEARING TEST: CPT | Performed by: AUDIOLOGIST

## 2023-06-02 PROCEDURE — 99203 OFFICE O/P NEW LOW 30 MIN: CPT | Performed by: OTOLARYNGOLOGY

## 2023-06-02 PROCEDURE — 92567 TYMPANOMETRY: CPT | Performed by: AUDIOLOGIST

## 2023-06-04 ENCOUNTER — PATIENT MESSAGE (OUTPATIENT)
Dept: PHYSICAL MEDICINE AND REHAB | Facility: CLINIC | Age: 58
End: 2023-06-04

## 2023-06-04 DIAGNOSIS — M54.2 NECK PAIN ON RIGHT SIDE: Primary | ICD-10-CM

## 2023-06-05 ENCOUNTER — PATIENT MESSAGE (OUTPATIENT)
Dept: PHYSICAL MEDICINE AND REHAB | Facility: CLINIC | Age: 58
End: 2023-06-05

## 2023-06-05 NOTE — TELEPHONE ENCOUNTER
From: Felix Mar MD  To: Melina Mcleod MD  Sent: 6/5/2023 8:47 AM CDT  Subject: MRI    Hi. If your office needs additional justification for the MRI, I have also tried naproxen for 2 weeks and ibuprofen for 2 weeks. I started Prednisone today, because the pain wakes me at night.   Rayshawn Amin

## 2023-06-05 NOTE — TELEPHONE ENCOUNTER
Per Patient update:   -6/4/23: \"I had quite a few sessions with Jaime Mercer and unfortunately I am not getting better. It seems like the pain is worse in the neck. I also feel it in my right shoulder and arm. I'm wondering if I should possibly get an MRI. \"  -6/5/23: \"If your office needs additional justification for the MRI, I have also tried naproxen for 2 weeks and ibuprofen for 2 weeks. I started Prednisone today, because the pain wakes me at night. \"      Per Stephanie Barros at [address] [place] [address] 2/27/23: \"Plan  I did a right upper trapezius trigger pint injection in the office today. (see procedure note)  She will get into the PT for the cervical spine. She will get cervical spine x-rays. She will follow up in 3 months or sooner if needed. The patient understands and agrees with the stated plan. \"    Patient completed cervical spine xrays on 4/28/23. Patient update forwarded on to Stephanie Barros for recommendations regarding possible MRI order.   Cervical spine MRI order pended for 's review/approval.

## 2023-06-07 ENCOUNTER — APPOINTMENT (OUTPATIENT)
Dept: PHYSICAL THERAPY | Facility: HOSPITAL | Age: 58
End: 2023-06-07
Attending: PHYSICAL MEDICINE & REHABILITATION
Payer: COMMERCIAL

## 2023-06-07 NOTE — TELEPHONE ENCOUNTER
Patient has MRI scheduled for 6/28/23. Message sent to patient via [employer]. Appts placed on hold for 7/3/23 8 am and 5:30 pm.    Message sent to patient via [employer]. Awaiting patient response.

## 2023-06-24 PROBLEM — M48.02 CERVICAL STENOSIS OF SPINE: Status: ACTIVE | Noted: 2023-06-24

## 2023-06-24 PROBLEM — M50.90 CERVICAL DISC DISEASE: Status: ACTIVE | Noted: 2023-06-24

## 2023-06-28 ENCOUNTER — HOSPITAL ENCOUNTER (OUTPATIENT)
Dept: MRI IMAGING | Age: 58
Discharge: HOME | End: 2023-06-28
Attending: PHYSICAL MEDICINE & REHABILITATION
Payer: COMMERCIAL

## 2023-06-28 DIAGNOSIS — M54.2 NECK PAIN ON RIGHT SIDE: ICD-10-CM

## 2023-06-28 PROCEDURE — 72141 MRI NECK SPINE W/O DYE: CPT | Performed by: PHYSICAL MEDICINE & REHABILITATION

## 2023-07-03 ENCOUNTER — OFFICE VISIT (OUTPATIENT)
Dept: PHYSICAL MEDICINE AND REHAB | Facility: CLINIC | Age: 58
End: 2023-07-03
Payer: COMMERCIAL

## 2023-07-03 DIAGNOSIS — M48.02 CERVICAL STENOSIS OF SPINE: Primary | ICD-10-CM

## 2023-07-03 DIAGNOSIS — M54.16 LUMBAR RADICULOPATHY: ICD-10-CM

## 2023-07-03 DIAGNOSIS — M50.90 CERVICAL DISC DISEASE: ICD-10-CM

## 2023-07-03 DIAGNOSIS — M51.9 LUMBAR DISC DISEASE: ICD-10-CM

## 2023-07-03 DIAGNOSIS — M54.12 CERVICAL RADICULOPATHY: ICD-10-CM

## 2023-07-03 DIAGNOSIS — M51.26 LUMBAR HERNIATED DISC: ICD-10-CM

## 2023-07-03 PROCEDURE — 99214 OFFICE O/P EST MOD 30 MIN: CPT | Performed by: PHYSICAL MEDICINE & REHABILITATION

## 2023-07-05 ENCOUNTER — TELEPHONE (OUTPATIENT)
Dept: PHYSICAL MEDICINE AND REHAB | Facility: CLINIC | Age: 58
End: 2023-07-05

## 2023-07-05 DIAGNOSIS — M48.02 CERVICAL STENOSIS OF SPINE: Primary | ICD-10-CM

## 2023-07-05 DIAGNOSIS — M50.90 CERVICAL DISC DISEASE: ICD-10-CM

## 2023-07-05 DIAGNOSIS — M25.512 CHRONIC PAIN OF BOTH SHOULDERS: ICD-10-CM

## 2023-07-05 DIAGNOSIS — M54.12 CERVICAL RADICULOPATHY: ICD-10-CM

## 2023-07-05 DIAGNOSIS — G89.29 CHRONIC PAIN OF BOTH SHOULDERS: ICD-10-CM

## 2023-07-05 DIAGNOSIS — M25.511 CHRONIC PAIN OF BOTH SHOULDERS: ICD-10-CM

## 2023-07-05 DIAGNOSIS — M54.2 NECK PAIN ON RIGHT SIDE: ICD-10-CM

## 2023-07-05 RX ORDER — ACETAMINOPHEN AND CODEINE PHOSPHATE 300; 30 MG/1; MG/1
1 TABLET ORAL EVERY 6 HOURS PRN
Qty: 20 TABLET | Refills: 0 | Status: SHIPPED | OUTPATIENT
Start: 2023-07-05

## 2023-07-05 NOTE — TELEPHONE ENCOUNTER
Patient has been scheduled for Right C7-T1 ILESI  on 7/7/2023 at the [facility] [address] [place] [address] with Hurley Nitish. -Anesthesia type: IVCS. -If scheduling [facility] covid testing required for all procedures whether patient is vaccinated or not. -Patient informed not to eat or drink anything after midnight the night prior to the procedure, if being sedated. (For afternoon injections: Patient to fast 6-8 hours prior to procedure with IVCS/MAC. )    Yes  -Patient was advised that if he/she does receive the covid vaccine it needs to be at least 2 weeks before or after the injection. -Medications and allergies reviewed. -Patient reminded to hold NSAIDs (Ibuprofen, ASA 81, Aleve, Naproxen, Mobic, Diclofenac, Etodolac, Celebrex etc.) for 3 days prior to [place]  if BMI is greater than 35. For Cervical injections only hold multivitamins, Vitamin E, Fish Oil, Phentermine (Lomaira) for 7 days prior to injection and NSAIDS.  mg to be held for 7 days prior to injections.  -If patient is receiving MAC/IVCS Phentermine Nora Day) will need to be held for 7 days prior to injection. -N/A If on blood thinner clearance has been received to hold this medication by provider.   -Patient informed he/she will need a  to and from procedure. Yes  -[facility] is located in the [facility] 1st floor. Patient may park in the yellow or purple parking. Follow up appointment has been scheduled for patient on: F/U as scheduled from LOV instructions, but call or send [employer] message with update 2 weeks after injection. Patient verbalized understanding and agrees with plan.  -----> Scheduled in Epic: Yes  -----> Scheduled in [employer]:  Yes

## 2023-07-05 NOTE — TELEPHONE ENCOUNTER
LVMTCB with Luna Starr at [facility] to see if Dr. Keven Bolton can have add-on cases for 7/07/2023

## 2023-07-05 NOTE — TELEPHONE ENCOUNTER
Initiated authorization for Right C7-T1 ILESI CPT 90390 dx:M54.12 to be done at [address] with [employer]  Status: Approved-authorization is not required per health plan        Per Dr. Elsie Gray w/ IVCS

## 2023-07-05 NOTE — TELEPHONE ENCOUNTER
Patient requested that Dr. Neli Power prescribe a small qty of Tylenol #3 for her to help with post-injection pain. Order pended for Dr. Antonio Marshall review.

## 2023-07-07 ENCOUNTER — OFFICE VISIT (OUTPATIENT)
Dept: SURGERY | Facility: CLINIC | Age: 58
End: 2023-07-07
Payer: COMMERCIAL

## 2023-07-07 DIAGNOSIS — M50.90 CERVICAL DISC DISEASE: ICD-10-CM

## 2023-07-07 DIAGNOSIS — M48.02 CERVICAL STENOSIS OF SPINE: ICD-10-CM

## 2023-07-07 DIAGNOSIS — M54.12 CERVICAL RADICULOPATHY: Primary | ICD-10-CM

## 2023-07-07 PROCEDURE — 62321 NJX INTERLAMINAR CRV/THRC: CPT | Performed by: PHYSICAL MEDICINE & REHABILITATION

## 2023-07-07 NOTE — PROCEDURES
[address]    CERVICAL INTERLAMINAR  NAME:  Jerel Baum MD    MR #:    [id number] :  1965     PHYSICIAN:  Mitzy Dejesus MD        Operative Report    DATE OF PROCEDURE: 2023   PREOPERATIVE DIAGNOSES: 1. right C5 radiculopathy    2. C5-6 left mild-modeate foraminal stenosis    3. C5-6 mild diffuse & left moderate foraminal, C3-4 mild diffuse & right mild-moderate foraminal, C2-3 mild central bulging discs        POSTOPERATIVE DIAGNOSES:   1. right C5 radiculopathy    2. C5-6 left mild-modeate foraminal stenosis    3. C5-6 mild diffuse & left moderate foraminal, C3-4 mild diffuse & right mild-moderate foraminal, C2-3 mild central bulging discs        PROCEDURES: Right C7-T1 inter laminar epidural steroid injection done under fluoroscopic guidance with contrast enhancement. SURGEON: Mitzy Denise MD   ANESTHESIA: IV conscious sedation   INDICATIONS:      OPERATIVE PROCEDURE:  Written consent was obtained from the patient. The patient was brought into the operating room and placed in the prone position on the fluoroscopy table with pillow underneath the chest and shoulders. The patient's skin was cleaned and draped in a normal sterile fashion. Using AP fluoroscopy, the right C7 lamina was identified. Skin was anesthetized with 1% PF lidocaine without epinephrine. Then, a 3-1/2 inch, 20-gauge Tuohy needle was inserted and directed towards the right C7 lamina. When it was engaged, it was walked inferiorly and medially until it was felt to drop off the inferomedial aspect. Then, Omnipaque-240 contrast was used to obtain a good epidurogram indicating correct needle placement. Then, aspiration was performed. No blood, fluid, or air was aspirated. Then, the patient was injected with a 6 cc solution of 2 cc of normal sterile saline and 2 cc of 1% PF lidocaine without epinephrine, and 2 cc of 6 mg/cc of Celestone Soluspan. Then, the needle was removed.   The patient's skin was cleaned. A Band-Aid was applied. The patient was transferred to the cart and into [facility]. The patient was given discharge instructions and will follow up in the clinic as scheduled. Throughout the whole procedure, the patient's pulse oximetry and vital signs were monitored and they remained completely stable. Also, throughout the whole procedure, prior to injection of any medication, aspiration was performed. No blood, fluid, or air was aspirated at anytime. The total time for conscious sedation was 8 minutes and the total medication used was 50 micrograms of IV Fentanyl and 2 mg of IV Versaid.

## 2023-07-23 ENCOUNTER — PATIENT MESSAGE (OUTPATIENT)
Dept: PHYSICAL MEDICINE AND REHAB | Facility: CLINIC | Age: 58
End: 2023-07-23

## 2023-07-23 DIAGNOSIS — M54.12 CERVICAL RADICULOPATHY: ICD-10-CM

## 2023-07-23 DIAGNOSIS — M25.512 CHRONIC PAIN OF BOTH SHOULDERS: ICD-10-CM

## 2023-07-23 DIAGNOSIS — G89.29 CHRONIC PAIN OF BOTH SHOULDERS: ICD-10-CM

## 2023-07-23 DIAGNOSIS — M25.511 CHRONIC PAIN OF BOTH SHOULDERS: ICD-10-CM

## 2023-07-23 DIAGNOSIS — M48.02 CERVICAL STENOSIS OF SPINE: ICD-10-CM

## 2023-07-23 DIAGNOSIS — M54.2 NECK PAIN ON RIGHT SIDE: ICD-10-CM

## 2023-07-23 DIAGNOSIS — M50.90 CERVICAL DISC DISEASE: ICD-10-CM

## 2023-07-24 RX ORDER — ACETAMINOPHEN AND CODEINE PHOSPHATE 300; 30 MG/1; MG/1
1 TABLET ORAL EVERY 6 HOURS PRN
Qty: 20 TABLET | Refills: 0 | Status: SHIPPED | OUTPATIENT
Start: 2023-07-24

## 2023-07-24 NOTE — TELEPHONE ENCOUNTER
From: Nidia Bella MD  To: Sajan Cerna MD  Sent: 7/23/2023 2:59 PM CDT  Subject: follow-up to injection and also Rx    Hi Sajan Lombardi,  This is my 2 week follow-up. I am much better--maybe 75% better. I still have sharp pains with movement, but I don't have constant pain. I take Aleve as needed. Thank you! I wasn't able to  the prescription for tylenol #3. (I had an old Rx and I took a few of those.) [facility] [place] said they never received it. I was wondering if I could have that if I have for days when the pain is worse. Could you please re-send it?    Taisha Pinto

## 2023-07-24 NOTE — TELEPHONE ENCOUNTER
Per patient previous Tylenol #3 rx was never received by the pharmacy. Per epic review previous rx printed and did not go to pharmacy. Rx set back up to be sent electronically to patient's [facility] pharmacy.

## 2023-11-02 ENCOUNTER — IMMUNIZATION (OUTPATIENT)
Dept: INTERNAL MEDICINE CLINIC | Facility: CLINIC | Age: 58
End: 2023-11-02

## 2023-11-02 DIAGNOSIS — Z23 NEED FOR VACCINATION: Primary | ICD-10-CM

## 2023-11-02 PROCEDURE — 90686 IIV4 VACC NO PRSV 0.5 ML IM: CPT | Performed by: FAMILY MEDICINE

## 2023-11-02 PROCEDURE — 90471 IMMUNIZATION ADMIN: CPT | Performed by: FAMILY MEDICINE

## 2023-12-13 ENCOUNTER — OFFICE VISIT (OUTPATIENT)
Dept: DERMATOLOGY CLINIC | Facility: CLINIC | Age: 58
End: 2023-12-13

## 2023-12-13 DIAGNOSIS — D49.2 NEOPLASM OF UNSPECIFIED BEHAVIOR OF BONE, SOFT TISSUE, AND SKIN: ICD-10-CM

## 2023-12-13 DIAGNOSIS — D22.9 MULTIPLE BENIGN NEVI: Primary | ICD-10-CM

## 2023-12-13 DIAGNOSIS — D18.01 CHERRY ANGIOMA: ICD-10-CM

## 2023-12-13 DIAGNOSIS — L82.1 SEBORRHEIC KERATOSES: ICD-10-CM

## 2023-12-13 DIAGNOSIS — L81.4 LENTIGINES: ICD-10-CM

## 2023-12-13 PROCEDURE — 99203 OFFICE O/P NEW LOW 30 MIN: CPT | Performed by: STUDENT IN AN ORGANIZED HEALTH CARE EDUCATION/TRAINING PROGRAM

## 2023-12-13 PROCEDURE — 88305 TISSUE EXAM BY PATHOLOGIST: CPT | Performed by: STUDENT IN AN ORGANIZED HEALTH CARE EDUCATION/TRAINING PROGRAM

## 2023-12-13 PROCEDURE — 11102 TANGNTL BX SKIN SINGLE LES: CPT | Performed by: STUDENT IN AN ORGANIZED HEALTH CARE EDUCATION/TRAINING PROGRAM

## 2023-12-13 NOTE — PROGRESS NOTES
December 13, 2023    New patient     CHIEF COMPLAINT: FBSE    HISTORY OF PRESENT ILLNESS: .    1. Spot of concern   Location: near left eye   Duration: 1-2 months  Signs and symptoms: Dry and raised  Current treatment: none   Past treatments: none       DERM HISTORY:  History of skin cancer: No    FAMILY HISTORY:  History of melanoma: No    PAST MEDICAL HISTORY:  Past Medical History:   Diagnosis Date    Anxiety state     Hypothyroid 5/28/2014    Insomnia 5/28/2014    Migraines 5/28/2014    Osteopenia 5/28/2014       REVIEW OF SYSTEMS:  Constitutional: Denies fever, chills, unintentional weight loss. Skin as per HPI    Medications  Current Outpatient Medications   Medication Sig Dispense Refill    Esomeprazole Magnesium 20 MG Oral Capsule Delayed Release Take 1 capsule (20 mg total) by mouth every morning before breakfast. 90 capsule 3    fluconazole 100 MG Oral Tab Take 3 tablets (300 mg total) by mouth once a week. For 2 weeks 40 tablet 1    predniSONE 10 MG Oral Tab 3 tabs daily for 3 days, then 2 tabs daily for 3 days, then 1.5 tabs daily for 3 days, then 1 tab daily for 3 days. 25 tablet 0    UNITHROID 100 MCG Oral Tab Take 1 tablet (100 mcg total) by mouth before breakfast. 90 tablet 3    citalopram 20 MG Oral Tab Take 1 tablet (20 mg total) by mouth daily. 90 tablet 3    Norethindrone-Eth Estradiol (JINTELI) 1-5 MG-MCG Oral Tab Take 1 tablet by mouth daily. 100 tablet 3    metoprolol succinate ER 50 MG Oral Tablet 24 Hr Take 1 tablet (50 mg total) by mouth every evening. 90 tablet 3    montelukast 10 MG Oral Tab Take 1 tablet (10 mg total) by mouth nightly. 90 tablet 3    Zolpidem Tartrate ER 6.25 MG Oral Tab CR Take 1 tablet (6.25 mg total) by mouth nightly as needed for Sleep. 30 tablet 0    Rizatriptan Benzoate (MAXALT) 10 MG Oral Tab Take 1 tablet (10 mg total) by mouth as needed for Migraine.  12 tablet 5       PHYSICAL EXAM:  Patient declined chaperone   General: awake, alert, no acute distress  Skin: Skin exam was performed today including the following: head and face, scalp, neck, chest (including breasts and axillae), abdomen, back, bilateral upper extremities, bilateral lower extremities, hands, feet, digits, nails. Pertinent findings include:   - Scattered bright red-purple dome-shaped papules on the trunk and extremities   - Scattered light brown stellate macules on sun exposed sites  - Scattered, evenly colored, round brown macules and papules with regular borders on the trunk and extremities  - Numerous scattered skin-colored and brown, waxy, stuck-on papules and plaques on the trunk and extremities  - L temple near lateral canthus with a pink scaly papule    ASSESSMENT & PLAN:  Pathophysiology of diagnoses discussed with patient. Therapeutic options reviewed. Risks, benefits, and alternatives discussed with patient. Instructions reviewed at length. #Lentigines  #Seborrheic keratoses   #Cherry angiomas   - Reassurance provided regarding the benign nature of these lesions. #Multiple benign nevi  - Complete skin exam performed today with no outlier lesions identified   - Reassured patient of benign nature of these lesions.   - Recommend daily photoprotection with broad-spectrum sunscreen, avoidance of sun during peak hours, and sun protective clothing.    - Dermoscopy was used for physical examination of pigmented lesions during today's office visit. #Neoplasm(s) of uncertain behavior of skin  - Shave biopsy performed today   - Will notify patient with results and arrange for appropriate definitive treatment, if indicated. Shave of lesion to establish and confirm diagnosis:  Photo taken: Yes    Risks, benefits, alternatives and personnel required for shave biopsy reviewed with patient. Risks discussed include, but not limited to: pain, bleeding, infection, scar, reaction to anesthetic, and recurrence/need for further treatment. Patient and physician agree as to site(s) to be biopsied. Patient verbalizes understanding and wishes to proceed. Site(s) prepped with alcohol and anesthetized with 1% lidocaine with epinephrine. Shave of lesion(s) performed to the level of the dermis. Specimen(s) from [facility]  sent for pathology to r/o NMSC vs ISK 50% ALCL and bandaging applied. Written and verbal wound care instructions provided to patient, understanding verbalized.       Return to clinic: pending biopsy or sooner if something concerning arises     Romana Jenkins, MD

## 2024-01-17 RX ORDER — METOPROLOL SUCCINATE 50 MG/1
50 TABLET, EXTENDED RELEASE ORAL EVERY EVENING
Qty: 90 TABLET | Refills: 3 | OUTPATIENT
Start: 2024-01-17

## 2024-05-14 ENCOUNTER — HOSPITAL ENCOUNTER (OUTPATIENT)
Dept: MAMMOGRAPHY | Age: 59
Discharge: HOME | End: 2024-05-14
Attending: INTERNAL MEDICINE

## 2024-05-14 DIAGNOSIS — Z12.31 ENCOUNTER FOR SCREENING MAMMOGRAM FOR MALIGNANT NEOPLASM OF BREAST: ICD-10-CM

## 2024-05-14 PROCEDURE — 77067 SCR MAMMO BI INCL CAD: CPT | Performed by: INTERNAL MEDICINE

## 2024-05-14 PROCEDURE — 77063 BREAST TOMOSYNTHESIS BI: CPT | Performed by: INTERNAL MEDICINE

## 2024-07-09 ENCOUNTER — OFFICE VISIT (OUTPATIENT)
Dept: PHYSICAL MEDICINE AND REHAB | Facility: CLINIC | Age: 59
End: 2024-07-09
Payer: COMMERCIAL

## 2024-07-09 VITALS — HEIGHT: 63 IN | BODY MASS INDEX: 24.63 KG/M2 | WEIGHT: 139 LBS

## 2024-07-09 DIAGNOSIS — M54.16 LUMBAR RADICULOPATHY: ICD-10-CM

## 2024-07-09 DIAGNOSIS — M51.26 LUMBAR HERNIATED DISC: ICD-10-CM

## 2024-07-09 DIAGNOSIS — M51.9 LUMBAR DISC DISEASE: ICD-10-CM

## 2024-07-09 DIAGNOSIS — M48.02 CERVICAL STENOSIS OF SPINE: Primary | ICD-10-CM

## 2024-07-09 PROCEDURE — 99214 OFFICE O/P EST MOD 30 MIN: CPT | Performed by: PHYSICAL MEDICINE & REHABILITATION

## 2024-07-09 NOTE — PATIENT INSTRUCTIONS
Plan  She will get back into the PT for the lumbar spine.    I will hold on doing bilateral L5 TFESI's.    She will follow up in 3 months or sooner if needed.

## 2024-07-09 NOTE — PROGRESS NOTES
Low Back Pain H & P    Chief Complaint:   Chief Complaint   Patient presents with    Hip Pain     LOV 7/7/23 pt is here with complaints of bilateral hip pain. No N/T. No physical therapy or recent injections. Pain is worsened in the mornings and reports it to be a sharp pain.takes aleve prn to ease pain. Pain  5/10     Nursing note reviewed and verified.    Patient was last seen on 7/3/2023.  She developed bilateral lateral hip pian about 6 months ago.    Description of the Pain  The pain is located in the bilateral low back, but the most significant pain is in the bilateral lateral hips..    The pain does not radiate. The low back pain will radiate to the ankles.  The pain at its best is 0/10. The pain at its worst is 8/10. The pain is currently  4/10.  The pain is described as a(n) sharp sensation.    The pain is worse in the morning and after running and laying on the ipsilateral side .    The pain is better walking.  There is no numbness.  There is no tingling in the legs.  There is not weakness in both legs.     Past Medical History   Past Medical History:    Anxiety state    Hypothyroid    Insomnia    Migraines    Osteopenia    SCC (squamous cell carcinoma)    Left temple       Past Surgical History   Past Surgical History:   Procedure Laterality Date    Colonoscopy  02/2009    normal, done at     Colonoscopy N/A 4/5/2019    normal    Egd  06/2021    Itzel biopsy stereo nodule 1 site right (cpt=19081) Right     Needle biopsy right      2011 right core    Other surgical history  12/2019    right foot    Tubal ligation         Family History   Family History   Problem Relation Age of Onset    Diabetes Father     Hypertension Father     Cancer Father     Other (CVAs) Father     Cancer Mother         rectal, squamous    Stroke Other     Heart Attack Neg     Breast Cancer Neg     Ovarian Cancer Neg     Uterine Cancer Neg     Pancreatic Cancer Neg     Colon Cancer Neg     Prostate Cancer Neg        Social  History   Social History     Socioeconomic History    Marital status:      Spouse name: Not on file    Number of children: Not on file    Years of education: Not on file    Highest education level: Not on file   Occupational History    Not on file   Tobacco Use    Smoking status: Never    Smokeless tobacco: Never   Vaping Use    Vaping status: Never Used   Substance and Sexual Activity    Alcohol use: Yes     Alcohol/week: 0.0 standard drinks of alcohol     Comment: 3 drinks weekly    Drug use: No    Sexual activity: Not on file   Other Topics Concern     Service Not Asked    Blood Transfusions Not Asked    Caffeine Concern No    Occupational Exposure Not Asked    Hobby Hazards Not Asked    Sleep Concern Not Asked    Stress Concern Not Asked    Weight Concern Not Asked    Special Diet Not Asked    Back Care Not Asked    Exercise Yes     Comment: 2-3x per week    Bike Helmet Not Asked    Seat Belt Not Asked    Self-Exams Not Asked    Grew up on a farm Not Asked    History of tanning Not Asked    Outdoor occupation Not Asked    Breast feeding No    Reaction to local anesthetic No    Pt has a pacemaker No    Pt has a defibrillator No   Social History Narrative    The patient does not use an assistive device..      The patient does live in a home with stairs.     Social Determinants of Health     Financial Resource Strain: Not on file   Food Insecurity: Not on file   Transportation Needs: Not on file   Physical Activity: Not on file   Stress: Not on file   Social Connections: Not on file   Housing Stability: Not on file       PE:  The patient does appear in her stated age in no distress.  The patient is well groomed.    Psychiatric:  The patient is alert and oriented x 3.  The patient has a normal affect and mood.      Respiratory:  No acute respiratory distress. Patient does not have a cough.    HEENT:  Extraocular muscles are intact. There is no kern icterus. Pupils are equal, round, and reactive to  light. No redness or discharge bilaterally.    Skin:  There are no rashes or lesions.    Vitals:  There were no vitals filed for this visit.    Gait:    Gait: Normal gait   Sit to Stand: no difficulty      Lumbar Spine:    Scoliosis: No scoliosis present     Lumbar Spine Palpation:    Spinous Processes: Non-tender for all Spinous Processes   Z-joints: Non-tender for all Z-joints   SIJ: Tender at bilateral SIJ   Piriformis Muscle: Tender at bilateral Piriformis muscle(s)   Greater Trochanteric Bursa: Tender at bilateral Greater Trochanteric Bursa(s)     Vascular lower extremity:   Dorsalis pedis pulse-RIGHT 2+   Dorsalis pedis pulse-LEFT 2+   Tibialis posterior pulse-RIGHT 2+   Tibialis posterior pulse-LEFT 2+     Neurological Lower Extremity:    Light Touch Sensation: Intact in bilateral Lower Extremities   LE Muscle Strength: All LE strength measurements 5/5 except:  Hamstring RIGHT:   4+/5  Hamstring LEFT:   4/5   RIGHT plantar reflexes: downward response   LEFT plantar reflexes: downward response   Reflexes: 2+ in bilateral lower extremities except:  Left Achilles tendon which are 1+     Neural Tension Tests Lumbar Spine:  Supine straight leg raise-RIGHT Negative for pain   Supine straight leg raise-LEFT Negative for pain     Hip: Hips are stable.    RIGHT hip ROM normal   LEFT hip ROM normal   Right hip flexion Negative pain   LEFT hip flexion Negative pain   RIGHT hip JOHN test Negative for pain   LEFT hip JOHN test Negative for pain   RIGHT hip internal rotation Negative for pain   LEFT hip internal rotation Negative for pain   RIGHT hip piriformis stretch test Negative for pain   LEFT hip piriformis stretch test Negative for pain     Assessment  1. C5-6 left mild-modeate foraminal stenosis    2. L3-4 mild-moderate diffuse bulging disc    3. L4-5 mild-moderate central HNP    4. bilateral L5 radiculopathies         Plan  She will get back into the PT for the lumbar spine.    I will hold on doing bilateral L5  TFELISEO's.    She will follow up in 3 months or sooner if needed.    The patient understands and agrees with the stated plan.  Mukesh Denise MD  7/9/2024

## 2024-07-18 ENCOUNTER — TELEPHONE (OUTPATIENT)
Dept: PHYSICAL THERAPY | Facility: HOSPITAL | Age: 59
End: 2024-07-18

## 2024-08-07 ENCOUNTER — APPOINTMENT (OUTPATIENT)
Dept: PHYSICAL THERAPY | Age: 59
End: 2024-08-07
Attending: PHYSICAL MEDICINE & REHABILITATION
Payer: COMMERCIAL

## 2024-08-08 ENCOUNTER — OFFICE VISIT (OUTPATIENT)
Dept: DERMATOLOGY CLINIC | Facility: CLINIC | Age: 59
End: 2024-08-08

## 2024-08-08 DIAGNOSIS — D22.9 MULTIPLE BENIGN NEVI: ICD-10-CM

## 2024-08-08 DIAGNOSIS — D18.01 CHERRY ANGIOMA: ICD-10-CM

## 2024-08-08 DIAGNOSIS — L81.4 LENTIGINES: ICD-10-CM

## 2024-08-08 DIAGNOSIS — Z85.828 HISTORY OF NONMELANOMA SKIN CANCER: ICD-10-CM

## 2024-08-08 DIAGNOSIS — Z51.81 MEDICATION MONITORING ENCOUNTER: Primary | ICD-10-CM

## 2024-08-08 DIAGNOSIS — L82.1 SEBORRHEIC KERATOSES: ICD-10-CM

## 2024-08-08 DIAGNOSIS — B35.1 ONYCHOMYCOSIS: ICD-10-CM

## 2024-08-08 PROCEDURE — 99214 OFFICE O/P EST MOD 30 MIN: CPT | Performed by: STUDENT IN AN ORGANIZED HEALTH CARE EDUCATION/TRAINING PROGRAM

## 2024-08-08 RX ORDER — TERBINAFINE HYDROCHLORIDE 250 MG/1
250 TABLET ORAL DAILY
COMMUNITY
Start: 2024-05-16 | End: 2024-08-08

## 2024-08-08 RX ORDER — TRAZODONE HYDROCHLORIDE 50 MG/1
1 TABLET ORAL NIGHTLY PRN
COMMUNITY
Start: 2024-07-03 | End: 2025-01-29

## 2024-08-08 RX ORDER — AZITHROMYCIN 500 MG/1
TABLET, FILM COATED ORAL
COMMUNITY
Start: 2024-04-29

## 2024-08-08 RX ORDER — CICLOPIROX 80 MG/ML
1 SOLUTION TOPICAL 2 TIMES DAILY
COMMUNITY
Start: 2024-07-05

## 2024-08-08 RX ORDER — ATOVAQUONE AND PROGUANIL HYDROCHLORIDE 250; 100 MG/1; MG/1
TABLET, FILM COATED ORAL
COMMUNITY
Start: 2024-04-29

## 2024-08-08 RX ORDER — OSELTAMIVIR PHOSPHATE 75 MG/1
75 CAPSULE ORAL 2 TIMES DAILY
COMMUNITY
Start: 2024-04-29

## 2024-08-08 RX ORDER — ITRACONAZOLE 100 MG/1
CAPSULE ORAL
Qty: 168 CAPSULE | Refills: 0 | Status: SHIPPED | OUTPATIENT
Start: 2024-08-08

## 2024-08-08 NOTE — PROGRESS NOTES
August 8, 2024    Established patient     Referred by:   No referring provider defined for this encounter.     CHIEF COMPLAINT:     HISTORY OF PRESENT ILLNESS: .    1. Toe fungus   Location: Bi-lateral toes   Duration: years   Past treatments: lamisil; had side effects and fluconazole; no improvement    DERM HISTORY:  History of skin cancer: Yes, SCC (L temple)  History of chronic skin disease/condition: No    FAMILY HISTORY:  History of melanoma: No  History of chronic skin disease/condition: No    History/Other:    REVIEW OF SYSTEMS:  Constitutional: Denies fever, chills, unintentional weight loss.   Skin as per HPI    PAST MEDICAL HISTORY:  Past Medical History:    Anxiety state    Hypothyroid    Insomnia    Migraines    Osteopenia    SCC (squamous cell carcinoma)    Left temple       Medications  Current Outpatient Medications   Medication Sig Dispense Refill    traZODone 50 MG Oral Tab Take 1 tablet (50 mg total) by mouth nightly as needed.      terbinafine 250 MG Oral Tab Take 1 tablet (250 mg total) by mouth daily.      oseltamivir 75 MG Oral Cap Take 1 capsule (75 mg total) by mouth 2 (two) times daily.      Ciclopirox Olamine 0.77 % External Cream Q12H.      Ciclopirox 8 % External Solution 1 Application 2 (two) times daily. APPLY TO AFFECTED AREA      azithromycin 500 MG Oral Tab TAKE 1 TABLET DAILY FOR 3 DAYS IF SYMPTOMS OF TRAVELERS DIARRHEA OCCUR      Atovaquone-Proguanil HCl 250-100 MG Oral Tab TAKE 1 TABLET BY MOUTH DAILY STARTING ONE DAY BEFORE DEPARTURE AND CONTINUING FOR 7 DAYS AFTER RETURN (Patient not taking: Reported on 8/8/2024)      Esomeprazole Magnesium 20 MG Oral Capsule Delayed Release Take 1 capsule (20 mg total) by mouth every morning before breakfast. (Patient not taking: Reported on 12/13/2023) 90 capsule 3    fluconazole 100 MG Oral Tab Take 3 tablets (300 mg total) by mouth once a week. For 2 weeks (Patient not taking: Reported on 12/13/2023) 40 tablet 1    predniSONE 10 MG Oral Tab  3 tabs daily for 3 days, then 2 tabs daily for 3 days, then 1.5 tabs daily for 3 days, then 1 tab daily for 3 days. (Patient not taking: Reported on 12/13/2023) 25 tablet 0    UNITHROID 100 MCG Oral Tab Take 1 tablet (100 mcg total) by mouth before breakfast. 90 tablet 3    citalopram 20 MG Oral Tab Take 1 tablet (20 mg total) by mouth daily. (Patient not taking: Reported on 7/9/2024) 90 tablet 3    Norethindrone-Eth Estradiol (JINTELI) 1-5 MG-MCG Oral Tab Take 1 tablet by mouth daily. 100 tablet 3    metoprolol succinate ER 50 MG Oral Tablet 24 Hr Take 1 tablet (50 mg total) by mouth every evening. (Patient not taking: Reported on 7/9/2024) 90 tablet 3    montelukast 10 MG Oral Tab Take 1 tablet (10 mg total) by mouth nightly. (Patient not taking: Reported on 12/13/2023) 90 tablet 3    Zolpidem Tartrate ER 6.25 MG Oral Tab CR Take 1 tablet (6.25 mg total) by mouth nightly as needed for Sleep. 30 tablet 0    Rizatriptan Benzoate (MAXALT) 10 MG Oral Tab Take 1 tablet (10 mg total) by mouth as needed for Migraine. (Patient not taking: Reported on 12/13/2023) 12 tablet 5       Objective:    PHYSICAL EXAM:  General: awake, alert, no acute distress  Skin: Skin exam was performed today including the following: Full body skin exam performed including scalp, face including eyelids, neck, chest, abdomen, back, arms, hands including nails, legs, feet, buttocks and was significant for:. Pertinent findings include:   - Scattered bright red-purple dome-shaped papules on the trunk and extremities   - Scattered light brown stellate macules on sun exposed sites  - Scattered, evenly colored, round brown macules and papules with regular borders on the trunk and extremities  - Numerous scattered skin-colored and brown, waxy, stuck-on papules and plaques on the trunk and extremities  - L temple with well healed scar   - Toenails with onychodystrophy    ASSESSMENT & PLAN:  Pathophysiology of diagnoses discussed with patient.   Therapeutic options reviewed. Risks, benefits, and alternatives discussed with patient. Instructions reviewed at length.    #Lentigines  #Seborrheic keratoses   #Cherry angiomas   - Reassurance provided regarding the benign nature of these lesions.  - Discussed that treatment considered cosmetic and not covered by insurance.     #Multiple benign nevi  - Complete skin exam performed today with no outlier lesions identified   - Reassured patient of benign nature of these lesions.   - Return for lesions that are growing, changing or symptomatic.   - Recommend daily photoprotection with broad-spectrum sunscreen, avoidance of sun during peak hours, and sun protective clothing.    - Dermoscopy was used for physical examination of pigmented lesions during today's office visit.    #History of nonmelanoma skin cancer  - No evidence of recurrence on exam. Continued monitoring. Regular skin exams discussed given increased risk of subsequent cutaneous malignancies.   - Should any areas change in size, shape or color, bleed or become tender, the patient will contact the office for evaluation sooner than their interval appointment.    #Onychomycosis  - labs 4/2024 WNL   - Start itraconazole pulse twice daily  for 1 week x 6 months     Return to clinic: 6 months or sooner if something concerning arises     Richy Abernathy MD

## 2024-08-09 ENCOUNTER — PATIENT MESSAGE (OUTPATIENT)
Dept: OTOLARYNGOLOGY | Facility: CLINIC | Age: 59
End: 2024-08-09

## 2024-08-09 ENCOUNTER — APPOINTMENT (OUTPATIENT)
Dept: PHYSICAL THERAPY | Age: 59
End: 2024-08-09
Attending: PHYSICAL MEDICINE & REHABILITATION
Payer: COMMERCIAL

## 2024-08-12 NOTE — TELEPHONE ENCOUNTER
From: Corrine Menjivar MD  To: Yazan Lopez  Sent: 8/9/2024 7:22 AM CDT  Subject: hearing loss    Hi Dr. Lopez,  Could you please write a prescription for hearing aids with the diagnosis of sensorineural hearing loss (H90. 3)? I saw you last year and recently went to [facility] for hearing aids. My plan will cover the hearing aids, but I need that diagnosis code.  Corrine

## 2024-08-13 ENCOUNTER — APPOINTMENT (OUTPATIENT)
Dept: PHYSICAL THERAPY | Age: 59
End: 2024-08-13
Attending: PHYSICAL MEDICINE & REHABILITATION
Payer: COMMERCIAL

## 2024-08-15 ENCOUNTER — APPOINTMENT (OUTPATIENT)
Dept: PHYSICAL THERAPY | Age: 59
End: 2024-08-15
Attending: PHYSICAL MEDICINE & REHABILITATION
Payer: COMMERCIAL

## 2024-08-20 ENCOUNTER — APPOINTMENT (OUTPATIENT)
Dept: PHYSICAL THERAPY | Age: 59
End: 2024-08-20
Attending: PHYSICAL MEDICINE & REHABILITATION
Payer: COMMERCIAL

## 2024-08-20 RX ORDER — NORETHINDRONE ACETATE AND ETHINYL ESTRADIOL .005; 1 MG/1; MG/1
1 TABLET, FILM COATED ORAL DAILY
Qty: 90 TABLET | Refills: 0 | Status: SHIPPED | OUTPATIENT
Start: 2024-08-20

## 2024-08-20 NOTE — TELEPHONE ENCOUNTER
Requested Prescriptions     Pending Prescriptions Disp Refills    FYAVOLV 1-5 MG-MCG Oral Tab [Pharmacy Med Name: FYAVOLV 1 MG-5 MCG TABLET] 90 tablet 4     Sig: TAKE 1 TABLET BY MOUTH EVERY DAY     Last annual 5/12/23  Last filled 5/12/23  Pap UTD  Mammo UTD    Next annual 10/8/24

## 2024-08-22 ENCOUNTER — APPOINTMENT (OUTPATIENT)
Dept: PHYSICAL THERAPY | Age: 59
End: 2024-08-22
Attending: PHYSICAL MEDICINE & REHABILITATION
Payer: COMMERCIAL

## 2024-09-05 ENCOUNTER — TELEPHONE (OUTPATIENT)
Dept: PHYSICAL THERAPY | Facility: HOSPITAL | Age: 59
End: 2024-09-05

## 2024-09-18 ENCOUNTER — TELEPHONE (OUTPATIENT)
Dept: PHYSICAL THERAPY | Facility: HOSPITAL | Age: 59
End: 2024-09-18

## 2024-09-19 ENCOUNTER — OFFICE VISIT (OUTPATIENT)
Dept: PHYSICAL THERAPY | Facility: HOSPITAL | Age: 59
End: 2024-09-19
Attending: PHYSICAL MEDICINE & REHABILITATION
Payer: COMMERCIAL

## 2024-09-19 DIAGNOSIS — M54.16 LUMBAR RADICULOPATHY: ICD-10-CM

## 2024-09-19 DIAGNOSIS — M51.9 LUMBAR DISC DISEASE: Primary | ICD-10-CM

## 2024-09-19 DIAGNOSIS — M51.26 LUMBAR HERNIATED DISC: ICD-10-CM

## 2024-09-19 PROCEDURE — 97110 THERAPEUTIC EXERCISES: CPT | Performed by: PHYSICAL THERAPIST

## 2024-09-19 PROCEDURE — 97530 THERAPEUTIC ACTIVITIES: CPT | Performed by: PHYSICAL THERAPIST

## 2024-09-19 PROCEDURE — 97161 PT EVAL LOW COMPLEX 20 MIN: CPT | Performed by: PHYSICAL THERAPIST

## 2024-09-19 NOTE — PROGRESS NOTES
LUMBAR SPINE EVALUATION:   Referring Physician: Dr. 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  Heat massager helps.  Also reports intermittent L calf pain.  Denies tingling/numbness.  Does feel weakness but Dr. Denise    Can't find a comfortable position to fall sleep.  Mornings are worse.        Run 3 times per week for 3.5 miles.  Doesn't hurt immediately but has pain evening and the next day.      Reports yoga made it worse.      Plays pickle ball and tennis     Sitting is better    Does extension twice a day  Nerve glides - didn't help, states they aggravated her pain      Wears Hoka    Fusion of the R great toe     History of current condition: had pain for several years.     Pain Rating:  Current   3/10 VAS    Current functional limitations include limited ability to tolerate standing, walking, difficutly sleeping and lying on her sides.    Corrine describes prior level of function independent in all activities. Pt goals include decrease pain, continue running.    Past medical history was reviewed with Corrine. Significant findings include   Past Medical History:    Anxiety state    Hypothyroid    Insomnia    Migraines    Osteopenia    SCC (squamous cell carcinoma)    Left temple     Past Surgical History:   Procedure Laterality Date    Colonoscopy  02/2009    normal, done at     Colonoscopy N/A 4/5/2019    normal    Egd  06/2021    Itzel biopsy stereo nodule 1 site right (cpt=19081) Right     Needle biopsy right      2011 right core    Other surgical history  12/2019    right foot    Tubal ligation       .     Are you being hurt, frightened, demeaned, or taken advantage of by anyone at your home or in your life?  No     Have you recently  had thoughts of hurting yourself?  No    Have you tried to hurt yourself in the past?  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  She also reports pain the evening/next day after running.  She reports running 3 times per week for 3.5 miles.  She presents with limited lumbar extension ROM but 5/5 strength in the LE's except for the great toes.  She also has limited first ray mobility on the R side and wears Hoka shoes for running.  She will benefit from skilled PT to improve her functional mobility and decrease her pain.    Precautions: None     OBJECTIVE:   Observation/Posture: hyperlordotic posture with bilateral lower rib flares  Gait: decreased toe off R  ROM:     Trunk         Pain (+/-)   Flexion Limited 25%                  With limited reversal of the lumbar lordosis   Extension Limited 25% At L4L5 and L5S1 area   R Sidebend WFL    L Sidebend WFL    R Rotation NT    L Rotation NT    R Sideglide WFL    L Sideglide WFL      Repeated Motion Testing: REIL - to end ROM, decreased/better/abolished L LE pain        STRENGTH:   5/5 MMT Scale   Left  Right Comments   Hip Flexion (L2)  5 5    Knee Extension (L3) 5 5    Knee Flexion 5 5    Ankle DF (L4) 5 5    EHL (L5) 4 4    Ankle PF (S1) 5 5    Hip Abduction NT NT    Hip Extension NT NT      Flexibility:      R L   Hamstrings long long   Piriformis long WFL       Neuro Screen: (-) heel walking and toe walking  Special Tests: (-) SLR/Slump bilaterlly    Outcome Surverys  Oswestry Disability Index Score  Score: 6 % (9/18/2024  4:33 PM)        Today’s Treatment and Response:     9/19/2024  Visit #      Manual Therapy    Therapeutic Exercise Educated in centralization of symptoms and precautions for therapy   Therapeutic Activity    Neuromuscular Education    TNE Education    HEP        The pt was educated on the plan of care, purpose and individual goals for therapy,  precautions for therapy.  All questions were answered.     Charges: PT Eval x 1 (low), TE1(13), TA1(10)    Total Timed treatment: 23 min      Total Treatment Time: 45 min          PLAN OF CARE:    Goals:      The pt was educated on the plan of care, purpose and individual goals for therapy, precautions for therapy.  All questions were answered.     1.  The pt will be independent in their HEP.  2.  Centralization of symptoms to the lumbar spine.  3.  The pt will be independent in a modified workout program.  4.  The pt will be able to sleep on her sides without an increase in pain.  5.  The pt will be report no increase in pain after running.    Frequency / Duration: Patient will be seen for 1-2 x/week or a total of 12 visits over a 90 day period.  Treatment will include: Manual Therapy; Therapeutic Exercises; Neuromuscular Re-education; Therapeutic Activity;  Patient education: Home exercise program instruction; TNE Education; Modalities as needed.    Education or treatment limitation: None  Rehab Potential good    Patient was advised of these findings, precautions, and treatment options and has agreed to actively participate in planning and for this course of care.    Thank you for your referral. Please co-sign or sign and return this letter via fax as soon as possible to [phone]. If you have any questions, please contact me at Dept: [phone]    Sincerely,  Electronically signed by therapist: TOMAS BENITEZ, PT    Physician's certification required: Yes  I certify the need for these services furnished under this plan of treatment and while under my care.    X___________________________________________________ Date____________________    Certification From: 9/19/2024  To:12/18/2024

## 2024-09-23 ENCOUNTER — OFFICE VISIT (OUTPATIENT)
Dept: PHYSICAL THERAPY | Facility: HOSPITAL | Age: 59
End: 2024-09-23
Attending: PHYSICAL MEDICINE & REHABILITATION
Payer: COMMERCIAL

## 2024-09-23 PROCEDURE — 97530 THERAPEUTIC ACTIVITIES: CPT | Performed by: PHYSICAL THERAPIST

## 2024-09-23 PROCEDURE — 97112 NEUROMUSCULAR REEDUCATION: CPT | Performed by: PHYSICAL THERAPIST

## 2024-09-23 NOTE — PROGRESS NOTES
LUMBAR SPINE EVALUATION:   Referring Physician: Dr. 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  Heat massager helps.  Also reports intermittent L calf pain.  Denies tingling/numbness.  Does feel weakness but Dr. Denise    Can't find a comfortable position to fall sleep.  Mornings are worse.        Run 3 times per week for 3.5 miles.  Doesn't hurt immediately but has pain evening and the next day.      Reports yoga made it worse.      Plays pickle ball and tennis     Sitting is better    Does extension twice a day  Nerve glides - didn't help, states they aggravated her pain      Wears Hoka    Fusion of the R great toe     History of current condition: had pain for several years.     Pain Rating:  Current   3/10 VAS    Current functional limitations include limited ability to tolerate standing, walking, difficutly sleeping and lying on her sides.    Corrine describes prior level of function independent in all activities. Pt goals include decrease pain, continue running.    Past medical history was reviewed with Corrine. Significant findings include   Past Medical History:    Anxiety state    Hypothyroid    Insomnia    Migraines    Osteopenia    SCC (squamous cell carcinoma)    Left temple     Past Surgical History:   Procedure Laterality Date    Colonoscopy  02/2009    normal, done at     Colonoscopy N/A 4/5/2019    normal    Egd  06/2021    Itzel biopsy stereo nodule 1 site right (cpt=19081) Right     Needle biopsy right      2011 right core    Other surgical history  12/2019    right foot    Tubal ligation       .     Are you being hurt, frightened, demeaned, or taken advantage of by anyone at your home or in your life?  No     Have you recently  had thoughts of hurting yourself?  No    Have you tried to hurt yourself in the past?  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  She also reports pain the evening/next day after running.  She reports running 3 times per week for 3.5 miles.  She presents with limited lumbar extension ROM but 5/5 strength in the LE's except for the great toes.  She also has limited first ray mobility on the R side and wears Hoka shoes for running.  She will benefit from skilled PT to improve her functional mobility and decrease her pain.    Precautions: None     OBJECTIVE:   Observation/Posture: hyperlordotic posture with bilateral lower rib flares  Gait: decreased toe off R  ROM:     Trunk         Pain (+/-)   Flexion Limited 25%                  With limited reversal of the lumbar lordosis   Extension Limited 25% At L4L5 and L5S1 area   R Sidebend WFL    L Sidebend WFL    R Rotation NT    L Rotation NT    R Sideglide WFL    L Sideglide WFL      Repeated Motion Testing: REIL - to end ROM, decreased/better/abolished L LE pain        STRENGTH:   5/5 MMT Scale   Left  Right Comments   Hip Flexion (L2)  5 5    Knee Extension (L3) 5 5    Knee Flexion 5 5    Ankle DF (L4) 5 5    EHL (L5) 4 4    Ankle PF (S1) 5 5    Hip Abduction NT NT    Hip Extension NT NT      Flexibility:      R L   Hamstrings long long   Piriformis long WFL       Neuro Screen: (-) heel walking and toe walking  Special Tests: (-) SLR/Slump bilaterlly    Outcome Surverys  Oswestry Disability Index Score  Score: 6 % (9/18/2024  4:33 PM)        Today’s Treatment and Response:     9/19/2024  Visit #  1    Manual Therapy    Therapeutic Exercise Prone lying    PPU's    PPU's with sag       Therapeutic Activity Educated in centralization of symptoms and precautions for therapy    Education on keep legs together to get to end ROM with PPUls    Education to avoid bending at the  waist   Neuromuscular Education    TNE Education    HEP PPU's with sag       The pt was educated on the plan of care, purpose and individual goals for therapy, precautions for therapy.  All questions were answered.     Charges: PT Eval x 1 (low), TE1(13), TA1(10)    Total Timed treatment: 23 min      Total Treatment Time: 45 min          PLAN OF CARE:    Goals:      The pt was educated on the plan of care, purpose and individual goals for therapy, precautions for therapy.  All questions were answered.     1.  The pt will be independent in their HEP.  2.  Centralization of symptoms to the lumbar spine.  3.  The pt will be independent in a modified workout program.  4.  The pt will be able to sleep on her sides without an increase in pain.  5.  The pt will be report no increase in pain after running.    Frequency / Duration: Patient will be seen for 1-2 x/week or a total of 12 visits over a 90 day period.  Treatment will include: Manual Therapy; Therapeutic Exercises; Neuromuscular Re-education; Therapeutic Activity;  Patient education: Home exercise program instruction; TNE Education; Modalities as needed.    Education or treatment limitation: None  Rehab Potential good    Patient was advised of these findings, precautions, and treatment options and has agreed to actively participate in planning and for this course of care.    Thank you for your referral. Please co-sign or sign and return this letter via fax as soon as possible to [phone]. If you have any questions, please contact me at Dept: [phone]    Sincerely,  Electronically signed by therapist: TOMAS BENITEZ, PT    Physician's certification required: Yes  I certify the need for these services furnished under this plan of treatment and while under my care.    X___________________________________________________ Date____________________    Certification From: 9/19/2024  To:12/18/2024

## 2024-09-23 NOTE — PROGRESS NOTES
2024  Dx: Lumbar disc disease (M51.9)  Lumbar herniated disc (M51.26)  Lumbar radiculopathy (M54.16)                 Authorized # of Visits:  12 visits on the POC          Next MD visit: none   Fall Risk: standard         Precautions:  general  Medication Changes since last visit?: No    Subjective: Reports she thinks she felt a little better after the first visit.  Not sure if she is doing her exercises correctly.      Pain Ratin/10 VAS    Objective:      2024  Visit #  1  2024  Visit # 2   Manual Therapy     Therapeutic Exercise Prone lying    PPU's    PPU's with sag        Therapeutic Activity Educated in centralization of symptoms and precautions for therapy    Education on keep legs together to get to end ROM with PPUls    Education to avoid bending at the waist Education on the asymmetry  of KIANNA activities    Education on L stance position     Education on sitting position   Neuromuscular Education  L SL, knee toward knee    Standing Wall Supported Left Knee Flexion   with Resisted Right Glute Max    Left Sidelying Left Flexed FA Adduction with Right Extended   FA Abduction and Left Abdominal Co-Activation    R glut max against the wall   TNE Education     HEP L SL knee to knee     L SL, knee toward knee    Standing Wall Supported Left Knee Flexion   with Resisted Right Glute Max    Left Sidelying Left Flexed FA Adduction with Right Extended   FA Abduction and Left Abdominal Co-Activation    R glut max against the wall         Assessment: No adverse effects to treatment.  The pt display 3-/5 HADLT at the beginning of the session and 4/5 by the end of the session. She was able to progress to standing integration activities this date.  She was advised to perform those activities before and after running and monitor her symptoms that evening/next day.  Educated the patient to only do the side that is on the handout and not to switch sides with her HEP.      Goals:      The pt was educated on  the plan of care, purpose and individual goals for therapy, precautions for therapy.  All questions were answered.     1.  The pt will be independent in their HEP.  2.  Centralization of symptoms to the lumbar spine.  3.  The pt will be independent in a modified workout program.  4.  The pt will be able to sleep on her sides without an increase in pain.  5.  The pt will be report no increase in pain after running.    Frequency / Duration: Patient will be seen for 1-2 x/week or a total of 12 visits over a 90 day period.  Treatment will include: Manual Therapy; Therapeutic Exercises; Neuromuscular Re-education; Therapeutic Activity;  Patient education: Home exercise program instruction; TNE Education; Modalities as needed.    Education or treatment limitation: None  Rehab Potential good    Certification From: 9/19/2024  To:12/18/2024        Charges: TA1(8), NM3(38)       Total Timed Treatment: 46 min  Total Treatment Time: 46 min          FOR REFERENCE ONLY   LUMBAR SPINE EVALUATION:   Referring Physician: Dr. 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  Heat massager helps.  Also reports intermittent L calf pain.  Denies tingling/numbness.  Does feel weakness but Dr. Denise    Can't find a comfortable position to fall sleep.  Mornings are worse.        Run 3 times per week for 3.5 miles.  Doesn't hurt immediately but has pain evening and the next day.      Reports yoga made it worse.      Plays pickle ball and tennis     Sitting is better    Does extension twice a day  Nerve glides - didn't help, states they aggravated her pain      Wears Hoka    Fusion of the R great toe     History of current condition: had pain for several years.     Pain Rating:   Current   3/10 VAS    Current functional limitations include limited ability to tolerate standing, walking, difficutly sleeping and lying on her sides.    Corrine describes prior level of function independent in all activities. Pt goals include decrease pain, continue running.    Past medical history was reviewed with Corrine. Significant findings include   Past Medical History:    Anxiety state    Hypothyroid    Insomnia    Migraines    Osteopenia    SCC (squamous cell carcinoma)    Left temple     Past Surgical History:   Procedure Laterality Date    Colonoscopy  02/2009    normal, done at     Colonoscopy N/A 4/5/2019    normal    Egd  06/2021    Itzel biopsy stereo nodule 1 site right (cpt=19081) Right     Needle biopsy right      2011 right core    Other surgical history  12/2019    right foot    Tubal ligation       .     Are you being hurt, frightened, demeaned, or taken advantage of by anyone at your home or in your life?  No     Have you recently had thoughts of hurting yourself?  No    Have you tried to hurt yourself in the past?  No      ASSESSMENT:    Corrine Tiara presents to therapy with a long hx of bilateral hip pain and reports difficulty lying on her sides.  She also reports pain the evening/next day after running.  She reports running 3 times per week for 3.5 miles.  She presents with limited lumbar extension ROM but 5/5 strength in the LE's except for the great toes.  She also has limited first ray mobility on the R side and wears Hoka shoes for running.  She will benefit from skilled PT to improve her functional mobility and decrease her pain.    Precautions: None     OBJECTIVE:   Observation/Posture: hyperlordotic posture with bilateral lower rib flares  Gait: decreased toe off R  ROM:     Trunk         Pain (+/-)   Flexion Limited 25%                  With limited reversal of the lumbar lordosis   Extension Limited 25% At L4L5 and L5S1 area   R Sidebend WFL    L Sidebend WFL    R Rotation NT     L Rotation NT    R Sideglide WFL    L Sideglide WFL      Repeated Motion Testing: REIL - to end ROM, decreased/better/abolished L LE pain        STRENGTH:   5/5 MMT Scale   Left  Right Comments   Hip Flexion (L2)  5 5    Knee Extension (L3) 5 5    Knee Flexion 5 5    Ankle DF (L4) 5 5    EHL (L5) 4 4    Ankle PF (S1) 5 5    Hip Abduction NT NT    Hip Extension NT NT      Flexibility:      R L   Hamstrings long long   Piriformis long WFL       Neuro Screen: (-) heel walking and toe walking  Special Tests: (-) SLR/Slump bilaterlly    Outcome Surverys  Oswestry Disability Index Score  Score: 6 % (9/18/2024  4:33 PM)

## 2024-09-30 ENCOUNTER — OFFICE VISIT (OUTPATIENT)
Dept: PHYSICAL THERAPY | Facility: HOSPITAL | Age: 59
End: 2024-09-30
Attending: PHYSICAL MEDICINE & REHABILITATION
Payer: COMMERCIAL

## 2024-09-30 PROCEDURE — 97530 THERAPEUTIC ACTIVITIES: CPT | Performed by: PHYSICAL THERAPIST

## 2024-09-30 PROCEDURE — 97112 NEUROMUSCULAR REEDUCATION: CPT | Performed by: PHYSICAL THERAPIST

## 2024-09-30 NOTE — PROGRESS NOTES
2024    Dx: Lumbar disc disease (M51.9)  Lumbar herniated disc (M51.26)  Lumbar radiculopathy (M54.16)                 Authorized # of Visits:  12 visits on the POC          Next MD visit: none   Fall Risk: standard         Precautions:  general  Medication Changes since last visit?: No    Subjective: Reports she ran in the morning and had pain in the evening.  Reports she is better overall and didn't have any pain after running the other day.  Has been doing her HEP regularly.  States she runs every other day.    Pain Ratin/10 VAS    Objective:      2024  Visit #  1  2024  Visit # 2 2024  Visit #3   Manual Therapy      Therapeutic Exercise Prone lying    PPU's    PPU's with sag         Therapeutic Activity Educated in centralization of symptoms and precautions for therapy    Education on keep legs together to get to end ROM with PPUls    Education to avoid bending at the waist Education on the asymmetry  of KIANNA activities    Education on L stance position     Education on sitting position L stance position during ADL's/shopping   Neuromuscular Education  L SL, knee toward knee    Standing Wall Supported Left Knee Flexion   with Resisted Right Glute Max    Left Sidelying Left Flexed FA Adduction with Right Extended   FA Abduction and Left Abdominal Co-Activation    R glut max against the wall 90/90 repositioning with L hip shift    Parapspinals release with L hamstring    Standing Wall Supported Left Knee Flexion   with Resisted Right Glute Max    L SL, knee towards knee   TNE Education      HEP L SL knee to knee     L SL, knee toward knee    Standing Wall Supported Left Knee Flexion   with Resisted Right Glute Max    Left Sidelying Left Flexed FA Adduction with Right Extended   FA Abduction and Left Abdominal Co-Activation    R glut max against the wall Paraspinal release with L hamstring    Standing Wall Supported Left Knee Flexion   with Resisted Right Glute Max    L SL, knee towards knee          Assessment: No adverse effects to treatment.  The pt displayed a (+) L ADT L this date and therefore trial of different activities for repositioning.  The pt responded the best to paraspinal release with L hamstring.  Also reviewed her other HEP and decided to keep the three activities listed above for her HEP since she displayed a 4/5 HADLT bilaterally after completing those activities.  Showed pt L stance for daily activities.      Goals:      The pt was educated on the plan of care, purpose and individual goals for therapy, precautions for therapy.  All questions were answered.     1.  The pt will be independent in their HEP.  2.  Centralization of symptoms to the lumbar spine.  3.  The pt will be independent in a modified workout program.  4.  The pt will be able to sleep on her sides without an increase in pain.  5.  The pt will be report no increase in pain after running.    Frequency / Duration: Patient will be seen for 1-2 x/week or a total of 12 visits over a 90 day period.  Treatment will include: Manual Therapy; Therapeutic Exercises; Neuromuscular Re-education; Therapeutic Activity;  Patient education: Home exercise program instruction; TNE Education; Modalities as needed.    Education or treatment limitation: None  Rehab Potential good    Certification From: 9/19/2024  To:12/18/2024        Charges: TA1(8), NM3(38)       Total Timed Treatment: 46 min  Total Treatment Time: 46 min          FOR REFERENCE ONLY   LUMBAR SPINE EVALUATION:   Referring Physician: Dr. 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  Heat massager helps.  Also reports intermittent L calf pain.  Denies tingling/numbness.  Does feel weakness but   Camelia    Can't find a comfortable position to fall sleep.  Mornings are worse.        Run 3 times per week for 3.5 miles.  Doesn't hurt immediately but has pain evening and the next day.      Reports yoga made it worse.      Plays pickle ball and tennis     Sitting is better    Does extension twice a day  Nerve glides - didn't help, states they aggravated her pain      Wears Hoka    Fusion of the R great toe     History of current condition: had pain for several years.     Pain Rating:  Current   3/10 VAS    Current functional limitations include limited ability to tolerate standing, walking, difficutly sleeping and lying on her sides.    Corrine describes prior level of function independent in all activities. Pt goals include decrease pain, continue running.    Past medical history was reviewed with Corrine. Significant findings include   Past Medical History:    Anxiety state    Hypothyroid    Insomnia    Migraines    Osteopenia    SCC (squamous cell carcinoma)    Left temple     Past Surgical History:   Procedure Laterality Date    Colonoscopy  02/2009    normal, done at     Colonoscopy N/A 4/5/2019    normal    Egd  06/2021    Itzel biopsy stereo nodule 1 site right (cpt=19081) Right     Needle biopsy right      2011 right core    Other surgical history  12/2019    right foot    Tubal ligation       .     Are you being hurt, frightened, demeaned, or taken advantage of by anyone at your home or in your life?  No     Have you recently had thoughts of hurting yourself?  No    Have you tried to hurt yourself in the past?  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  She also reports pain the evening/next day after running.  She reports running 3 times per week for 3.5 miles.  She presents with limited lumbar extension ROM but 5/5 strength in the LE's except for the great toes.  She also has limited first ray mobility on the R  side and wears Hoka shoes for running.  She will benefit from skilled PT to improve her functional mobility and decrease her pain.    Precautions: None     OBJECTIVE:   Observation/Posture: hyperlordotic posture with bilateral lower rib flares  Gait: decreased toe off R  ROM:     Trunk         Pain (+/-)   Flexion Limited 25%                  With limited reversal of the lumbar lordosis   Extension Limited 25% At L4L5 and L5S1 area   R Sidebend WFL    L Sidebend WFL    R Rotation NT    L Rotation NT    R Sideglide WFL    L Sideglide WFL      Repeated Motion Testing: REIL - to end ROM, decreased/better/abolished L LE pain        STRENGTH:   5/5 MMT Scale   Left  Right Comments   Hip Flexion (L2)  5 5    Knee Extension (L3) 5 5    Knee Flexion 5 5    Ankle DF (L4) 5 5    EHL (L5) 4 4    Ankle PF (S1) 5 5    Hip Abduction NT NT    Hip Extension NT NT      Flexibility:      R L   Hamstrings long long   Piriformis long WFL       Neuro Screen: (-) heel walking and toe walking  Special Tests: (-) SLR/Slump bilaterlly    Outcome Surverys  Oswestry Disability Index Score  Score: 6 % (9/18/2024  4:33 PM)

## 2024-10-07 ENCOUNTER — OFFICE VISIT (OUTPATIENT)
Dept: PHYSICAL THERAPY | Facility: HOSPITAL | Age: 59
End: 2024-10-07
Attending: PHYSICAL MEDICINE & REHABILITATION
Payer: COMMERCIAL

## 2024-10-07 PROCEDURE — 97112 NEUROMUSCULAR REEDUCATION: CPT | Performed by: PHYSICAL THERAPIST

## 2024-10-07 NOTE — PROGRESS NOTES
10/7/2024    Dx: Lumbar disc disease (M51.9)  Lumbar herniated disc (M51.26)  Lumbar radiculopathy (M54.16)                 Authorized # of Visits:  12 visits on the POC          Next MD visit: none   Fall Risk: standard         Precautions:  general  Medication Changes since last visit?: No    Subjective: Reports she went running this morning.  Reports her knees are a little sore. State she hadn't ran for 3 days so thinks it the paraspinal release with L hamstring activity that is causing her L knee pain.    Pain Ratin/10 VAS    Objective:      2024  Visit #  1  2024  Visit # 2 2024  Visit #3 10/7/2024  Visit #4   Manual Therapy       Therapeutic Exercise Prone lying    PPU's    PPU's with sag          Therapeutic Activity Educated in centralization of symptoms and precautions for therapy    Education on keep legs together to get to end ROM with PPUls    Education to avoid bending at the waist Education on the asymmetry  of KIANNA activities    Education on L stance position     Education on sitting position L stance position during ADL's/shopping    Neuromuscular Education  L SL, knee toward knee    Standing Wall Supported Left Knee Flexion   with Resisted Right Glute Max    Left Sidelying Left Flexed FA Adduction with Right Extended   FA Abduction and Left Abdominal Co-Activation    R glut max against the wall 90/90 repositioning with L hip shift    Parapspinals release with L hamstring    Standing Wall Supported Left Knee Flexion   with Resisted Right Glute Max    L SL, knee towards knee Paraspinal release with L hamstring - on chair      90/90 with L hip shift      Standing Wall Supported Right Knee Flexion  with Left Glute Med and Right Trunk Rotation             TNE Education       HEP L SL knee to knee     L SL, knee toward knee    Standing Wall Supported Left Knee Flexion   with Resisted Right Glute Max    Left Sidelying Left Flexed FA Adduction with Right Extended   FA Abduction and Left  Abdominal Co-Activation    R glut max against the wall Paraspinal release with L hamstring    Standing Wall Supported Left Knee Flexion   with Resisted Right Glute Max    L SL, knee towards knee Paraspinal release with L hamstring - on chair      90/90 with L hip shift      Standing Wall Supported Right Knee Flexion  with Left Glute Med and Right Trunk Rotation         Assessment: No adverse effects to treatment.  The  pt continue to lack L hamstring recruitment as seen by the (+) L ADT.  Added 90/90 positioning and changed paraspinals release with L hamstring position to chair to facilitate more hamstring recruitment.  The pt tested neutral afterwards and her HEP was updated.      Goals:      The pt was educated on the plan of care, purpose and individual goals for therapy, precautions for therapy.  All questions were answered.     1.  The pt will be independent in their HEP.  2.  Centralization of symptoms to the lumbar spine.  3.  The pt will be independent in a modified workout program.  4.  The pt will be able to sleep on her sides without an increase in pain.  5.  The pt will be report no increase in pain after running.    Frequency / Duration: Patient will be seen for 1-2 x/week or a total of 12 visits over a 90 day period.  Treatment will include: Manual Therapy; Therapeutic Exercises; Neuromuscular Re-education; Therapeutic Activity;  Patient education: Home exercise program instruction; TNE Education; Modalities as needed.    Education or treatment limitation: None  Rehab Potential good    Certification From: 9/19/2024  To:12/18/2024        Charges: , NM3(38)       Total Timed Treatment: 38 min  Total Treatment Time: 38 min          FOR REFERENCE ONLY   LUMBAR SPINE EVALUATION:   Referring Physician: Dr. 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  Heat massager helps.  Also reports intermittent L calf pain.  Denies tingling/numbness.  Does feel weakness but Dr. Denise    Can't find a comfortable position to fall sleep.  Mornings are worse.        Run 3 times per week for 3.5 miles.  Doesn't hurt immediately but has pain evening and the next day.      Reports yoga made it worse.      Plays pickle ball and tennis     Sitting is better    Does extension twice a day  Nerve glides - didn't help, states they aggravated her pain      Wears Hoka    Fusion of the R great toe     History of current condition: had pain for several years.     Pain Rating:  Current   3/10 VAS    Current functional limitations include limited ability to tolerate standing, walking, difficutly sleeping and lying on her sides.    Corrine describes prior level of function independent in all activities. Pt goals include decrease pain, continue running.    Past medical history was reviewed with Corrine. Significant findings include   Past Medical History:    Anxiety state    Hypothyroid    Insomnia    Migraines    Osteopenia    SCC (squamous cell carcinoma)    Left temple     Past Surgical History:   Procedure Laterality Date    Colonoscopy  02/2009    normal, done at     Colonoscopy N/A 4/5/2019    normal    Egd  06/2021    Itzel biopsy stereo nodule 1 site right (cpt=19081) Right     Needle biopsy right      2011 right core    Other surgical history  12/2019    right foot    Tubal ligation       .     Are you being hurt, frightened, demeaned, or taken advantage of by anyone at your home or in your life?  No     Have you recently had thoughts of hurting yourself?  No    Have you tried to hurt yourself in the past?  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  She also reports pain the  evening/next day after running.  She reports running 3 times per week for 3.5 miles.  She presents with limited lumbar extension ROM but 5/5 strength in the LE's except for the great toes.  She also has limited first ray mobility on the R side and wears Hoka shoes for running.  She will benefit from skilled PT to improve her functional mobility and decrease her pain.    Precautions: None     OBJECTIVE:   Observation/Posture: hyperlordotic posture with bilateral lower rib flares  Gait: decreased toe off R  ROM:     Trunk         Pain (+/-)   Flexion Limited 25%                  With limited reversal of the lumbar lordosis   Extension Limited 25% At L4L5 and L5S1 area   R Sidebend WFL    L Sidebend WFL    R Rotation NT    L Rotation NT    R Sideglide WFL    L Sideglide WFL      Repeated Motion Testing: REIL - to end ROM, decreased/better/abolished L LE pain        STRENGTH:   5/5 MMT Scale   Left  Right Comments   Hip Flexion (L2)  5 5    Knee Extension (L3) 5 5    Knee Flexion 5 5    Ankle DF (L4) 5 5    EHL (L5) 4 4    Ankle PF (S1) 5 5    Hip Abduction NT NT    Hip Extension NT NT      Flexibility:      R L   Hamstrings long long   Piriformis long WFL       Neuro Screen: (-) heel walking and toe walking  Special Tests: (-) SLR/Slump bilaterlly    Outcome Surverys  Oswestry Disability Index Score  Score: 6 % (9/18/2024  4:33 PM)

## 2024-10-08 ENCOUNTER — TELEPHONE (OUTPATIENT)
Dept: PHYSICAL THERAPY | Facility: HOSPITAL | Age: 59
End: 2024-10-08

## 2024-10-14 ENCOUNTER — APPOINTMENT (OUTPATIENT)
Dept: PHYSICAL THERAPY | Facility: HOSPITAL | Age: 59
End: 2024-10-14
Attending: PHYSICAL MEDICINE & REHABILITATION
Payer: COMMERCIAL

## 2024-10-21 ENCOUNTER — APPOINTMENT (OUTPATIENT)
Dept: PHYSICAL THERAPY | Facility: HOSPITAL | Age: 59
End: 2024-10-21
Attending: PHYSICAL MEDICINE & REHABILITATION
Payer: COMMERCIAL

## 2024-11-11 ENCOUNTER — OFFICE VISIT (OUTPATIENT)
Dept: PHYSICAL THERAPY | Facility: HOSPITAL | Age: 59
End: 2024-11-11
Attending: PHYSICAL MEDICINE & REHABILITATION
Payer: COMMERCIAL

## 2024-11-11 PROCEDURE — 97140 MANUAL THERAPY 1/> REGIONS: CPT | Performed by: PHYSICAL THERAPIST

## 2024-11-11 PROCEDURE — 97112 NEUROMUSCULAR REEDUCATION: CPT | Performed by: PHYSICAL THERAPIST

## 2024-11-11 NOTE — PROGRESS NOTES
Subjective:       Patient ID: Lily Munoz is a 22 y.o. female.    Chief Complaint:  Well Woman      History of Present Illness  Pt here for gyn annual.  History and past labs reviewed with patient.    Complaints of some sharp pain during her cycle. Does report that it is only during her cycle and does correlate with having bowel movements. + hot flashes when happening. Occasionally comes with nausea.       Past Medical History:   Diagnosis Date    ADHD        History reviewed. No pertinent surgical history.    OB:    Gyn: no STD, never had abn pap   Meds:   Current Outpatient Medications:     drospirenone-ethinyl estradioL (TAWANA) 3-0.03 mg per tablet, Take 1 tablet by mouth once daily., Disp: , Rfl:     VYVANSE 40 mg Cap, Take 40 mg by mouth once daily., Disp: , Rfl:     All: Review of patient's allergies indicates:  No Known Allergies    SH:   Social History     Tobacco Use    Smoking status: Never Smoker    Smokeless tobacco: Never Used   Substance Use Topics    Alcohol use: Yes     Comment: occasionally      FH: Family history is unknown by patient.      Review of Systems  Review of Systems   Constitutional: Negative for chills and fever.   Respiratory: Negative for shortness of breath.    Cardiovascular: Negative for chest pain.   Gastrointestinal: Positive for abdominal pain and diarrhea. Negative for blood in stool, constipation, nausea, vomiting and reflux.   Genitourinary: Negative for dysmenorrhea, dyspareunia, dysuria, hematuria, hot flashes, menorrhagia, menstrual problem, pelvic pain, vaginal bleeding, vaginal discharge, postcoital bleeding and vaginal dryness.   Musculoskeletal: Negative for arthralgias and joint swelling.   Integumentary:  Negative for rash, hair changes, breast mass, nipple discharge and breast skin changes.   Psychiatric/Behavioral: Negative for depression. The patient is not nervous/anxious.    Breast: Negative for asymmetry, lump, mass, nipple discharge and  2024    Dx: Lumbar disc disease (M51.9)  Lumbar herniated disc (M51.26)  Lumbar radiculopathy (M54.16)                 Authorized # of Visits:  12 visits on the POC          Next MD visit: none   Fall Risk: standard         Precautions:  general  Medication Changes since last visit?: No    Subjective: Reports she has been having more knee pain.  Reports she feel better with rest.  States she has medial joint line pain L.  Reports she had more pain with the hills/stairs.    Pain Ratin/10 VAS    Objective:      2024  Visit #  1  2024  Visit # 2 2024  Visit #3 10/7/2024  Visit #4 2024  Visit #5   Manual Therapy     Manual therapy L knee    Patella mobs  Long axis distraction  Joint mobs in sitting   Therapeutic Exercise Prone lying    PPU's    PPU's with sag           Therapeutic Activity Educated in centralization of symptoms and precautions for therapy    Education on keep legs together to get to end ROM with PPUls    Education to avoid bending at the waist Education on the asymmetry  of KIANNA activities    Education on L stance position     Education on sitting position L stance position during ADL's/shopping     Neuromuscular Education  L SL, knee toward knee    Standing Wall Supported Left Knee Flexion   with Resisted Right Glute Max    Left Sidelying Left Flexed FA Adduction with Right Extended   FA Abduction and Left Abdominal Co-Activation    R glut max against the wall 90/90 repositioning with L hip shift    Parapspinals release with L hamstring    Standing Wall Supported Left Knee Flexion   with Resisted Right Glute Max    L SL, knee towards knee Paraspinal release with L hamstring - on chair      90/90 with L hip shift      Standing Wall Supported Right Knee Flexion  with Left Glute Med and Right Trunk Rotation           Right Sidelying Left Anterior Glute Med with TFL Inhibition    Supine diagonal flexion R     R knee supportive taping   TNE Education        HEP L SL knee to  knee     L SL, knee toward knee    Standing Wall Supported Left Knee Flexion   with Resisted Right Glute Max    Left Sidelying Left Flexed FA Adduction with Right Extended   FA Abduction and Left Abdominal Co-Activation    R glut max against the wall Paraspinal release with L hamstring    Standing Wall Supported Left Knee Flexion   with Resisted Right Glute Max    L SL, knee towards knee Paraspinal release with L hamstring - on chair      90/90 with L hip shift      Standing Wall Supported Right Knee Flexion  with Left Glute Med and Right Trunk Rotation Right Sidelying Left Anterior Glute Med with TFL Inhibition    Supine diagonal flexion R     Continue previous HEP except for standing activities         Assessment: No adverse effects to treatment.  The  pt returned from being out of town with reports of increased L knee pain after running hills.  States she has not been able to run consistently secondary to her pain.  Reports she does feels some LBP but her LBP and bilateral hip pain has lessoned since doing her PT exercises.  Added manual therapy and taping to the L knee along with L ITB inhibition and R triceps/R low trap activation techniques.  The pt will stop her standing integration activities and add the two activities listed above.  Will re-assess the L knee next visit to see if symptoms improved.      Goals:      The pt was educated on the plan of care, purpose and individual goals for therapy, precautions for therapy.  All questions were answered.     1.  The pt will be independent in their HEP.  2.  Centralization of symptoms to the lumbar spine.  3.  The pt will be independent in a modified workout program.  4.  The pt will be able to sleep on her sides without an increase in pain.  5.  The pt will be report no increase in pain after running.    Frequency / Duration: Patient will be seen for 1-2 x/week or a total of 12 visits over a 90 day period.  Treatment will include: Manual Therapy; Therapeutic  skin changes          Objective:     Vitals:    05/24/22 1021   BP: 123/76   Pulse: 99   Weight: 77.6 kg (171 lb)       Physical Exam:   Constitutional: She appears well-developed and well-nourished. No distress.    HENT:   Head: Normocephalic and atraumatic.    Eyes: Conjunctivae and EOM are normal.    Neck: No tracheal deviation present. No thyromegaly present.    Cardiovascular: Exam reveals no clubbing, no cyanosis and no edema.     Pulmonary/Chest: Effort normal. No respiratory distress.        Abdominal: Soft. She exhibits no distension and no mass. There is no abdominal tenderness. There is no rebound and no guarding. No hernia.     Genitourinary:    Vagina, uterus and rectum normal.      Pelvic exam was performed with patient supine.   There is no rash, tenderness, lesion or injury on the right labia. There is no rash, tenderness, lesion or injury on the left labia. Cervix is normal. Right adnexum displays no mass, no tenderness and no fullness. Left adnexum displays no mass, no tenderness and no fullness.                Skin: She is not diaphoretic. No cyanosis. Nails show no clubbing.            Assessment:        1. Well woman exam with routine gynecological exam    2. Lower abdominal pain                Plan:      Annual   Pap today   STD panel   Contraception - OCPs refilled   Pelvic US ordered   Risk assessment for inherited gyn cancer done - neg   RTC  1 year          Exercises; Neuromuscular Re-education; Therapeutic Activity;  Patient education: Home exercise program instruction; TNE Education; Modalities as needed.    Education or treatment limitation: None  Rehab Potential good    Certification From: 9/19/2024  To:12/18/2024        Charges: ,Man2 (23), NM2(23)     Total Timed Treatment: 46 min  Total Treatment Time: 46 min          FOR REFERENCE ONLY   LUMBAR SPINE EVALUATION:   Referring Physician: Dr. 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  Heat massager helps.  Also reports intermittent L calf pain.  Denies tingling/numbness.  Does feel weakness but Dr. Denise    Can't find a comfortable position to fall sleep.  Mornings are worse.        Run 3 times per week for 3.5 miles.  Doesn't hurt immediately but has pain evening and the next day.      Reports yoga made it worse.      Plays pickle ball and tennis     Sitting is better    Does extension twice a day  Nerve glides - didn't help, states they aggravated her pain      Wears Hoka    Fusion of the R great toe     History of current condition: had pain for several years.     Pain Rating:  Current   3/10 VAS    Current functional limitations include limited ability to tolerate standing, walking, difficutly sleeping and lying on her sides.    Corrine describes prior level of function independent in all activities. Pt goals include decrease pain, continue running.    Past medical history was reviewed with Corrine. Significant findings include   Past Medical History:    Anxiety state    Hypothyroid    Insomnia    Migraines    Osteopenia    SCC (squamous cell carcinoma)    Left temple     Past Surgical History:   Procedure Laterality Date    Colonoscopy   02/2009    normal, done at     Colonoscopy N/A 4/5/2019    normal    Egd  06/2021    Itzel biopsy stereo nodule 1 site right (cpt=19081) Right     Needle biopsy right      2011 right core    Other surgical history  12/2019    right foot    Tubal ligation       .     Are you being hurt, frightened, demeaned, or taken advantage of by anyone at your home or in your life?  No     Have you recently had thoughts of hurting yourself?  No    Have you tried to hurt yourself in the past?  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  She also reports pain the evening/next day after running.  She reports running 3 times per week for 3.5 miles.  She presents with limited lumbar extension ROM but 5/5 strength in the LE's except for the great toes.  She also has limited first ray mobility on the R side and wears Hoka shoes for running.  She will benefit from skilled PT to improve her functional mobility and decrease her pain.    Precautions: None     OBJECTIVE:   Observation/Posture: hyperlordotic posture with bilateral lower rib flares  Gait: decreased toe off R  ROM:     Trunk         Pain (+/-)   Flexion Limited 25%                  With limited reversal of the lumbar lordosis   Extension Limited 25% At L4L5 and L5S1 area   R Sidebend WFL    L Sidebend WFL    R Rotation NT    L Rotation NT    R Sideglide WFL    L Sideglide WFL      Repeated Motion Testing: REIL - to end ROM, decreased/better/abolished L LE pain        STRENGTH:   5/5 MMT Scale   Left  Right Comments   Hip Flexion (L2)  5 5    Knee Extension (L3) 5 5    Knee Flexion 5 5    Ankle DF (L4) 5 5    EHL (L5) 4 4    Ankle PF (S1) 5 5    Hip Abduction NT NT    Hip Extension NT NT      Flexibility:      R L   Hamstrings long long   Piriformis long WFL       Neuro Screen: (-) heel walking and toe walking  Special Tests: (-) SLR/Slump bilaterlly    Outcome Surverys  Oswestry  Disability Index Score  Score: 6 % (9/18/2024  4:33 PM)

## 2024-11-18 ENCOUNTER — OFFICE VISIT (OUTPATIENT)
Dept: PHYSICAL THERAPY | Facility: HOSPITAL | Age: 59
End: 2024-11-18
Attending: PHYSICAL MEDICINE & REHABILITATION
Payer: COMMERCIAL

## 2024-11-18 PROCEDURE — 97110 THERAPEUTIC EXERCISES: CPT | Performed by: PHYSICAL THERAPIST

## 2024-11-18 PROCEDURE — 97112 NEUROMUSCULAR REEDUCATION: CPT | Performed by: PHYSICAL THERAPIST

## 2024-11-18 PROCEDURE — 97140 MANUAL THERAPY 1/> REGIONS: CPT | Performed by: PHYSICAL THERAPIST

## 2024-11-18 NOTE — PROGRESS NOTES
2024  Dx: Lumbar disc disease (M51.9)  Lumbar herniated disc (M51.26)  Lumbar radiculopathy (M54.16)                 Authorized # of Visits:  12 visits on the POC          Next MD visit: none   Fall Risk: standard         Precautions:  general  Medication Changes since last visit?: No    Subjective: Reports her knee pain is less since the last visit.  Reports she wore the tape for 2 days and then took it off.  Felt it might have help. Also has been avoiding running but has bee going to the gym and doing the elliptical for workouts.  Also doing some weight machines.    Pain Ratin/10 VAS    Objective:      2024  Visit #  1  2024  Visit # 2 2024  Visit #3 10/7/2024  Visit #4 2024  Visit #5 2024  Visit #6   Manual Therapy     Manual therapy L knee    Patella mobs  Long axis distraction  Joint mobs in sitting Manual therapy L knee    Patella mobs  Long axis distraction  Joint mobs in sitting   Therapeutic Exercise Prone lying    PPU's    PPU's with sag         SLR in sitting    Wall squats with holds    Anterior step ups - 8 inch step    Anterior step downs - 8 inch step   Therapeutic Activity Educated in centralization of symptoms and precautions for therapy    Education on keep legs together to get to end ROM with PPUls    Education to avoid bending at the waist Education on the asymmetry  of KIANNA activities    Education on L stance position     Education on sitting position L stance position during ADL's/shopping      Neuromuscular Education  L SL, knee toward knee    Standing Wall Supported Left Knee Flexion   with Resisted Right Glute Max    Left Sidelying Left Flexed FA Adduction with Right Extended   FA Abduction and Left Abdominal Co-Activation    R glut max against the wall 90/90 repositioning with L hip shift    Parapspinals release with L hamstring    Standing Wall Supported Left Knee Flexion   with Resisted Right Glute Max    L SL, knee towards knee Paraspinal release with  L hamstring - on chair      90/90 with L hip shift      Standing Wall Supported Right Knee Flexion  with Left Glute Med and Right Trunk Rotation           Right Sidelying Left Anterior Glute Med with TFL Inhibition    Supine diagonal flexion R     R knee supportive taping R knee taping - PF repositioning with lateral butress       TNE Education         HEP L SL knee to knee     L SL, knee toward knee    Standing Wall Supported Left Knee Flexion   with Resisted Right Glute Max    Left Sidelying Left Flexed FA Adduction with Right Extended   FA Abduction and Left Abdominal Co-Activation    R glut max against the wall Paraspinal release with L hamstring    Standing Wall Supported Left Knee Flexion   with Resisted Right Glute Max    L SL, knee towards knee Paraspinal release with L hamstring - on chair      90/90 with L hip shift      Standing Wall Supported Right Knee Flexion  with Left Glute Med and Right Trunk Rotation Right Sidelying Left Anterior Glute Med with TFL Inhibition    Supine diagonal flexion R     Continue previous HEP except for standing activities SLR in sitting    Wall squats with holds    Anterior step ups - 8 inch step         Assessment: No adverse effects to treatment.  Less knee pain this date but has not fully resolved.  Trial of patella femoral repositioning taping instead of supportive taping.  The pt will assess which taping technique gave her the most relief over the next two days.  Worked on mechanics with stairs both ascending and descending.  Given a quad strengthening HEP.    Goals:      The pt was educated on the plan of care, purpose and individual goals for therapy, precautions for therapy.  All questions were answered.     1.  The pt will be independent in their HEP.  2.  Centralization of symptoms to the lumbar spine.  3.  The pt will be independent in a modified workout program.  4.  The pt will be able to sleep on her sides without an increase in pain.  5.  The pt will be report  no increase in pain after running.    Frequency / Duration: Patient will be seen for 1-2 x/week or a total of 12 visits over a 90 day period.  Treatment will include: Manual Therapy; Therapeutic Exercises; Neuromuscular Re-education; Therapeutic Activity;  Patient education: Home exercise program instruction; TNE Education; Modalities as needed.    Education or treatment limitation: None  Rehab Potential good    Certification From: 9/19/2024  To:12/18/2024        Charges: ,Man1 (15), NM1(10), TE2(23)  Total Timed Treatment: 48 min  Total Treatment Time: 48 min          FOR REFERENCE ONLY   LUMBAR SPINE EVALUATION:   Referring Physician: Dr. 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  Heat massager helps.  Also reports intermittent L calf pain.  Denies tingling/numbness.  Does feel weakness but Dr. Denise    Can't find a comfortable position to fall sleep.  Mornings are worse.        Run 3 times per week for 3.5 miles.  Doesn't hurt immediately but has pain evening and the next day.      Reports yoga made it worse.      Plays pickle ball and tennis     Sitting is better    Does extension twice a day  Nerve glides - didn't help, states they aggravated her pain      Wears Hoka    Fusion of the R great toe     History of current condition: had pain for several years.     Pain Rating:  Current   3/10 VAS    Current functional limitations include limited ability to tolerate standing, walking, difficutly sleeping and lying on her sides.    Corrine describes prior level of function independent in all activities. Pt goals include decrease pain, continue running.    Past medical history was reviewed with Corrine. Significant findings include   Past Medical  History:    Anxiety state    Hypothyroid    Insomnia    Migraines    Osteopenia    SCC (squamous cell carcinoma)    Left temple     Past Surgical History:   Procedure Laterality Date    Colonoscopy  02/2009    normal, done at     Colonoscopy N/A 4/5/2019    normal    Egd  06/2021    Itzel biopsy stereo nodule 1 site right (cpt=19081) Right     Needle biopsy right      2011 right core    Other surgical history  12/2019    right foot    Tubal ligation       .     Are you being hurt, frightened, demeaned, or taken advantage of by anyone at your home or in your life?  No     Have you recently had thoughts of hurting yourself?  No    Have you tried to hurt yourself in the past?  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  She also reports pain the evening/next day after running.  She reports running 3 times per week for 3.5 miles.  She presents with limited lumbar extension ROM but 5/5 strength in the LE's except for the great toes.  She also has limited first ray mobility on the R side and wears Hoka shoes for running.  She will benefit from skilled PT to improve her functional mobility and decrease her pain.    Precautions: None     OBJECTIVE:   Observation/Posture: hyperlordotic posture with bilateral lower rib flares  Gait: decreased toe off R  ROM:     Trunk         Pain (+/-)   Flexion Limited 25%                  With limited reversal of the lumbar lordosis   Extension Limited 25% At L4L5 and L5S1 area   R Sidebend WFL    L Sidebend WFL    R Rotation NT    L Rotation NT    R Sideglide WFL    L Sideglide WFL      Repeated Motion Testing: REIL - to end ROM, decreased/better/abolished L LE pain        STRENGTH:   5/5 MMT Scale   Left  Right Comments   Hip Flexion (L2)  5 5    Knee Extension (L3) 5 5    Knee Flexion 5 5    Ankle DF (L4) 5 5    EHL (L5) 4 4    Ankle PF (S1) 5 5    Hip Abduction NT NT    Hip Extension NT NT       Flexibility:      R L   Hamstrings long long   Piriformis long WFL       Neuro Screen: (-) heel walking and toe walking  Special Tests: (-) SLR/Slump bilaterlly    Outcome Surverys  Oswestry Disability Index Score  Score: 6 % (9/18/2024  4:33 PM)

## 2024-12-02 ENCOUNTER — APPOINTMENT (OUTPATIENT)
Dept: PHYSICAL THERAPY | Facility: HOSPITAL | Age: 59
End: 2024-12-02
Attending: PHYSICAL MEDICINE & REHABILITATION
Payer: COMMERCIAL

## 2024-12-18 ENCOUNTER — TELEPHONE (OUTPATIENT)
Dept: PHYSICAL THERAPY | Facility: HOSPITAL | Age: 59
End: 2024-12-18

## 2025-01-03 ENCOUNTER — APPOINTMENT (OUTPATIENT)
Dept: PHYSICAL THERAPY | Facility: HOSPITAL | Age: 60
End: 2025-01-03
Attending: INTERNAL MEDICINE
Payer: COMMERCIAL

## 2025-01-09 ENCOUNTER — OFFICE VISIT (OUTPATIENT)
Dept: PHYSICAL THERAPY | Facility: HOSPITAL | Age: 60
End: 2025-01-09
Attending: INTERNAL MEDICINE
Payer: COMMERCIAL

## 2025-01-09 PROCEDURE — 97530 THERAPEUTIC ACTIVITIES: CPT | Performed by: PHYSICAL THERAPIST

## 2025-01-09 PROCEDURE — 97140 MANUAL THERAPY 1/> REGIONS: CPT | Performed by: PHYSICAL THERAPIST

## 2025-01-09 PROCEDURE — 97110 THERAPEUTIC EXERCISES: CPT | Performed by: PHYSICAL THERAPIST

## 2025-01-09 NOTE — PROGRESS NOTES
2025    Dx: Lumbar disc disease (M51.9)  Lumbar herniated disc (M51.26)  Lumbar radiculopathy (M54.16)                 Authorized # of Visits:  12 visits on the POC          Next MD visit: none   Fall Risk: standard         Precautions:  general  Medication Changes since last visit?: No    Subjective: Reports she threw out her back yesterday morning.  States was picking up a humidifier and felt pain.  Reports she got much worse in an hour.      States she   Pain Ratin/10 VAS    Objective:      2024  Visit #3 10/7/2024  Visit #4 2024  Visit #5 2024  Visit #6 2025  Visit #7   Manual Therapy   Manual therapy L knee    Patella mobs  Long axis distraction  Joint mobs in sitting Manual therapy L knee    Patella mobs  Long axis distraction  Joint mobs in sitting Central PA L4L5 and L5S1    Lumbar rocking    Unilateral PA L5 L and R    Piriformis release   Therapeutic Exercise    SLR in sitting    Wall squats with holds    Anterior step ups - 8 inch step    Anterior step downs - 8 inch step Prone over pillows    Prone lying    PPU's    Therapeutic Activity L stance position during ADL's/shopping    Education in centralization fo symptoms   Neuromuscular Education 90/90 repositioning with L hip shift    Parapspinals release with L hamstring    Standing Wall Supported Left Knee Flexion   with Resisted Right Glute Max    L SL, knee towards knee Paraspinal release with L hamstring - on chair      90/90 with L hip shift      Standing Wall Supported Right Knee Flexion  with Left Glute Med and Right Trunk Rotation           Right Sidelying Left Anterior Glute Med with TFL Inhibition    Supine diagonal flexion R     R knee supportive taping R knee taping - PF repositioning with lateral butress        TNE Education        HEP Paraspinal release with L hamstring    Standing Wall Supported Left Knee Flexion   with Resisted Right Glute Max    L SL, knee towards knee Paraspinal release with L hamstring - on  chair      90/90 with L hip shift      Standing Wall Supported Right Knee Flexion  with Left Glute Med and Right Trunk Rotation Right Sidelying Left Anterior Glute Med with TFL Inhibition    Supine diagonal flexion R     Continue previous HEP except for standing activities SLR in sitting    Wall squats with holds    Anterior step ups - 8 inch step PPUs         Assessment: No adverse effects to treatment.  The -pt had been previously doing well but had increased LBP after moving furniture the day before.  She responded well to an extension based program with gentle manual therapy.  Will continue PT as tolerated.    Goals:      The pt was educated on the plan of care, purpose and individual goals for therapy, precautions for therapy.  All questions were answered.     1.  The pt will be independent in their HEP.  2.  Centralization of symptoms to the lumbar spine.  3.  The pt will be independent in a modified workout program.  4.  The pt will be able to sleep on her sides without an increase in pain.  5.  The pt will be report no increase in pain after running.    Frequency / Duration: Patient will be seen for 1-2 x/week or a total of 12 visits over a 90 day period.  Treatment will include: Manual Therapy; Therapeutic Exercises; Neuromuscular Re-education; Therapeutic Activity;  Patient education: Home exercise program instruction; TNE Education; Modalities as needed.    Education or treatment limitation: None  Rehab Potential good    Certification From: 9/19/2024  To:12/18/2024        Charges: ,Man2 (23), TA1(10), TE1 (13)  Total Timed Treatment: 48 min  Total Treatment Time: 48 min          FOR REFERENCE ONLY   LUMBAR SPINE EVALUATION:   Referring Physician: Dr. Perez ref. 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  Heat massager helps.  Also reports intermittent L calf pain.  Denies tingling/numbness.  Does feel weakness but Dr. Denise    Can't find a comfortable position to fall sleep.  Mornings are worse.        Run 3 times per week for 3.5 miles.  Doesn't hurt immediately but has pain evening and the next day.      Reports yoga made it worse.      Plays pickle ball and tennis     Sitting is better    Does extension twice a day  Nerve glides - didn't help, states they aggravated her pain      Wears Hoka    Fusion of the R great toe     History of current condition: had pain for several years.     Pain Rating:  Current   3/10 VAS    Current functional limitations include limited ability to tolerate standing, walking, difficutly sleeping and lying on her sides.    Corrine describes prior level of function independent in all activities. Pt goals include decrease pain, continue running.    Past medical history was reviewed with Corrine. Significant findings include   Past Medical History:    Anxiety state    Hypothyroid    Insomnia    Migraines    Osteopenia    SCC (squamous cell carcinoma)    Left temple     Past Surgical History:   Procedure Laterality Date    Colonoscopy  02/2009    normal, done at     Colonoscopy N/A 4/5/2019    normal    Egd  06/2021    Itzel biopsy stereo nodule 1 site right (cpt=19081) Right     Needle biopsy right      2011 right core    Other surgical history  12/2019    right foot    Tubal ligation       .     Are you being hurt, frightened, demeaned, or taken advantage of by anyone at your home or in your life?  No     Have you recently had thoughts of hurting yourself?  No    Have you tried to hurt yourself in the past?  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  She also reports pain the evening/next day after running.  She reports  running 3 times per week for 3.5 miles.  She presents with limited lumbar extension ROM but 5/5 strength in the LE's except for the great toes.  She also has limited first ray mobility on the R side and wears Hoka shoes for running.  She will benefit from skilled PT to improve her functional mobility and decrease her pain.    Precautions: None     OBJECTIVE:   Observation/Posture: hyperlordotic posture with bilateral lower rib flares  Gait: decreased toe off R  ROM:     Trunk         Pain (+/-)   Flexion Limited 25%                  With limited reversal of the lumbar lordosis   Extension Limited 25% At L4L5 and L5S1 area   R Sidebend WFL    L Sidebend WFL    R Rotation NT    L Rotation NT    R Sideglide WFL    L Sideglide WFL      Repeated Motion Testing: REIL - to end ROM, decreased/better/abolished L LE pain        STRENGTH:   5/5 MMT Scale   Left  Right Comments   Hip Flexion (L2)  5 5    Knee Extension (L3) 5 5    Knee Flexion 5 5    Ankle DF (L4) 5 5    EHL (L5) 4 4    Ankle PF (S1) 5 5    Hip Abduction NT NT    Hip Extension NT NT      Flexibility:      R L   Hamstrings long long   Piriformis long WFL       Neuro Screen: (-) heel walking and toe walking  Special Tests: (-) SLR/Slump bilaterlly    Outcome Surverys  Oswestry Disability Index Score  Score: 6 % (9/18/2024  4:33 PM)

## 2025-02-03 ENCOUNTER — TELEPHONE (OUTPATIENT)
Dept: PHYSICAL THERAPY | Facility: HOSPITAL | Age: 60
End: 2025-02-03

## 2025-02-11 ENCOUNTER — APPOINTMENT (OUTPATIENT)
Dept: PHYSICAL THERAPY | Facility: HOSPITAL | Age: 60
End: 2025-02-11
Attending: INTERNAL MEDICINE
Payer: COMMERCIAL

## 2025-02-11 ENCOUNTER — OFFICE VISIT (OUTPATIENT)
Dept: DERMATOLOGY CLINIC | Facility: CLINIC | Age: 60
End: 2025-02-11

## 2025-02-11 DIAGNOSIS — D22.9 MULTIPLE BENIGN NEVI: ICD-10-CM

## 2025-02-11 DIAGNOSIS — L82.1 SEBORRHEIC KERATOSES: Primary | ICD-10-CM

## 2025-02-11 DIAGNOSIS — L70.8 ACNEIFORM ERUPTION: ICD-10-CM

## 2025-02-11 DIAGNOSIS — D18.01 CHERRY ANGIOMA: ICD-10-CM

## 2025-02-11 PROCEDURE — 99214 OFFICE O/P EST MOD 30 MIN: CPT | Performed by: STUDENT IN AN ORGANIZED HEALTH CARE EDUCATION/TRAINING PROGRAM

## 2025-02-11 RX ORDER — CLINDAMYCIN PHOSPHATE 10 UG/ML
LOTION TOPICAL
Qty: 60 ML | Refills: 11 | Status: SHIPPED | OUTPATIENT
Start: 2025-02-11

## 2025-02-11 NOTE — PROGRESS NOTES
Established patient     Referred by:   No referring provider defined for this encounter.     CHIEF COMPLAINT: FBSE    HISTORY OF PRESENT ILLNESS: .    1. Recheck of SCC  Location: L temple   Bleeding, growing, changing?: No  Scaly?:No    Itchy?:No    Current treatment: None      DERM HISTORY:  History of skin cancer: Yes: SCC  History of melanoma: No    FAMILY HISTORY:  History of melanoma: No    PAST MEDICAL HISTORY:  Past Medical History:    Anxiety state    Hypothyroid    Insomnia    Migraines    Osteopenia    SCC (squamous cell carcinoma)    Left temple       REVIEW OF SYSTEMS:  Constitutional: Denies fever, chills, unintentional weight loss.   Skin as per HPI    Medications  Current Outpatient Medications   Medication Sig Dispense Refill    Norethindrone-Eth Estradiol (FYAVOLV) 1-5 MG-MCG Oral Tab TAKE 1 TABLET BY MOUTH EVERY DAY 90 tablet 0    oseltamivir 75 MG Oral Cap Take 1 capsule (75 mg total) by mouth 2 (two) times daily. (Patient not taking: Reported on 8/8/2024)      Ciclopirox Olamine 0.77 % External Cream Q12H. (Patient not taking: Reported on 8/8/2024)      Ciclopirox 8 % External Solution 1 Application 2 (two) times daily. APPLY TO AFFECTED AREA (Patient not taking: Reported on 8/8/2024)      azithromycin 500 MG Oral Tab TAKE 1 TABLET DAILY FOR 3 DAYS IF SYMPTOMS OF TRAVELERS DIARRHEA OCCUR      Atovaquone-Proguanil HCl 250-100 MG Oral Tab TAKE 1 TABLET BY MOUTH DAILY STARTING ONE DAY BEFORE DEPARTURE AND CONTINUING FOR 7 DAYS AFTER RETURN (Patient not taking: Reported on 8/8/2024)      itraconazole 100 MG Oral Cap Take 2 tablets twice daily  for 1 weeks each month. Repeat for 6 months, Check labs 3 months in. 168 capsule 0    Esomeprazole Magnesium 20 MG Oral Capsule Delayed Release Take 1 capsule (20 mg total) by mouth every morning before breakfast. (Patient not taking: Reported on 12/13/2023) 90 capsule 3    predniSONE 10 MG Oral Tab 3 tabs daily for 3 days, then 2 tabs daily for 3 days, then  1.5 tabs daily for 3 days, then 1 tab daily for 3 days. (Patient not taking: Reported on 12/13/2023) 25 tablet 0    UNITHROID 100 MCG Oral Tab Take 1 tablet (100 mcg total) by mouth before breakfast. 90 tablet 3    citalopram 20 MG Oral Tab Take 1 tablet (20 mg total) by mouth daily. (Patient not taking: Reported on 7/9/2024) 90 tablet 3    metoprolol succinate ER 50 MG Oral Tablet 24 Hr Take 1 tablet (50 mg total) by mouth every evening. (Patient not taking: Reported on 7/9/2024) 90 tablet 3    montelukast 10 MG Oral Tab Take 1 tablet (10 mg total) by mouth nightly. (Patient not taking: Reported on 12/13/2023) 90 tablet 3    Zolpidem Tartrate ER 6.25 MG Oral Tab CR Take 1 tablet (6.25 mg total) by mouth nightly as needed for Sleep. 30 tablet 0    Rizatriptan Benzoate (MAXALT) 10 MG Oral Tab Take 1 tablet (10 mg total) by mouth as needed for Migraine. (Patient not taking: Reported on 12/13/2023) 12 tablet 5       PHYSICAL EXAM:  Patient declined chaperone   General: awake, alert, no acute distress  Skin: Skin exam was performed today including the following: head and face, scalp, neck, chest (including breasts and axillae), abdomen, back, bilateral upper extremities, bilateral lower extremities, hands, feet, digits, nails. Pertinent findings include:   - Scattered bright red-purple dome-shaped papules on the trunk and extremities   - Scattered light brown stellate macules on sun exposed sites  - Scattered, evenly colored, round brown macules and papules with regular borders on the trunk and extremities  - Numerous scattered skin-colored and brown, waxy, stuck-on papules and plaques on the trunk and extremities      ASSESSMENT & PLAN:  Pathophysiology of diagnoses discussed with patient.  Therapeutic options reviewed. Risks, benefits, and alternatives discussed with patient. Instructions reviewed at length.    #Lentigines  #Seborrheic keratoses   #Cherry angiomas   - Reassurance provided regarding the benign nature  of these lesions.    #Multiple benign nevi  - Complete skin exam performed today with no outlier lesions identified   - Reassured patient of benign nature of these lesions.   - Recommend daily photoprotection with broad-spectrum sunscreen, avoidance of sun during peak hours, and sun protective clothing.    - Dermoscopy was used for physical examination of pigmented lesions during today's office visit.    #History of nonmelanoma skin cancer  - No evidence of recurrence on exam. Continued monitoring. Regular skin exams discussed given increased risk of subsequent cutaneous malignancies.   - Should any areas change in size, shape or color, bleed or become tender, the patient will contact the office for evaluation sooner than their interval appointment.    #Onychodystrophy  #Onychomycosis   - Will plan re-check in 6 months. S/p itraconazole course.     #Acneiform eruption  - Clindamycin 1% lotion bid to affected areas with flares       Return to clinic: 6 months or sooner if something concerning arises     Richy Abernathy MD

## 2025-02-21 ENCOUNTER — APPOINTMENT (OUTPATIENT)
Dept: PHYSICAL THERAPY | Facility: HOSPITAL | Age: 60
End: 2025-02-21
Attending: INTERNAL MEDICINE
Payer: COMMERCIAL

## 2025-06-02 ENCOUNTER — HOSPITAL ENCOUNTER (OUTPATIENT)
Dept: MAMMOGRAPHY | Age: 60
Discharge: HOME | End: 2025-06-02
Attending: INTERNAL MEDICINE
Payer: COMMERCIAL

## 2025-06-02 ENCOUNTER — HOSPITAL ENCOUNTER (OUTPATIENT)
Dept: BONE DENSITY | Age: 60
Discharge: HOME | End: 2025-06-02
Attending: INTERNAL MEDICINE
Payer: COMMERCIAL

## 2025-06-02 DIAGNOSIS — M85.80 OSTEOPENIA, UNSPECIFIED LOCATION: ICD-10-CM

## 2025-06-02 DIAGNOSIS — Z12.31 ENCOUNTER FOR SCREENING MAMMOGRAM FOR MALIGNANT NEOPLASM OF BREAST: ICD-10-CM

## 2025-06-02 PROCEDURE — 77063 BREAST TOMOSYNTHESIS BI: CPT | Performed by: INTERNAL MEDICINE

## 2025-06-02 PROCEDURE — 77067 SCR MAMMO BI INCL CAD: CPT | Performed by: INTERNAL MEDICINE

## 2025-06-02 PROCEDURE — 77080 DXA BONE DENSITY AXIAL: CPT | Performed by: INTERNAL MEDICINE

## 2025-08-12 ENCOUNTER — OFFICE VISIT (OUTPATIENT)
Dept: DERMATOLOGY CLINIC | Facility: CLINIC | Age: 60
End: 2025-08-12

## 2025-08-12 DIAGNOSIS — L81.4 LENTIGINES: ICD-10-CM

## 2025-08-12 DIAGNOSIS — L82.1 SEBORRHEIC KERATOSES: Primary | ICD-10-CM

## 2025-08-12 DIAGNOSIS — D18.01 CHERRY ANGIOMA: ICD-10-CM

## 2025-08-12 DIAGNOSIS — D22.9 MULTIPLE BENIGN NEVI: ICD-10-CM

## 2025-08-12 PROCEDURE — 99213 OFFICE O/P EST LOW 20 MIN: CPT | Performed by: STUDENT IN AN ORGANIZED HEALTH CARE EDUCATION/TRAINING PROGRAM

## 2025-08-12 RX ORDER — CITALOPRAM HYDROBROMIDE 10 MG/1
TABLET ORAL
COMMUNITY
Start: 2025-06-24

## (undated) DEVICE — MEDI-VAC NON-CONDUCTIVE SUCTION TUBING 6MM X 1.8M (6FT.) L: Brand: CARDINAL HEALTH

## (undated) DEVICE — LINE MNTR ADLT SET O2 INTMD

## (undated) DEVICE — 3 ML SYRINGE LUER-LOCK TIP: Brand: MONOJECT

## (undated) DEVICE — Device: Brand: CUSTOM PROCEDURE KIT

## (undated) DEVICE — 6 ML SYRINGE LUER-LOCK TIP: Brand: MONOJECT

## (undated) DEVICE — STERILE LATEX POWDER-FREE SURGICAL GLOVESWITH NITRILE COATING: Brand: PROTEXIS

## (undated) DEVICE — Device: Brand: DEFENDO AIR/WATER/SUCTION AND BIOPSY VALVE

## (undated) NOTE — LETTER
7/7/2023      Dc Rodney Gray MD  Physical Medicine and Rehabilitation  [address] [facility], [address] [place] [address]  Dept: [phone]  Dept Fax: [phone]        RE: Consultation for Maximo Cedeno MD        Dear Fay Swann MD,    Thank you very much for the opportunity to see your patient. Attached please find a summary from your patient's recent visit. I appreciate the chance to take care of your patient with you. Please feel free to call me with any questions or concerns. Sincerely,        Alessandro Grimaldo.  Elsie Gray MD  Electronically Signed on 7/7/2023

## (undated) NOTE — LETTER
[facility] SURGERY SCHEDULING FORM   [address] [facility]   [phone] (scheduling phone) [phone] (scheduling fax)     PATIENT INFORMATION   Last Name:      Audra Huertas Name:    Sigrid Ledesma []  No or using our own   Allergies: Adhesive Tape         Completed by:     Gardenia RODRIGUEZ      Date:    6/19/2020

## (undated) NOTE — LETTER
3/19/2021      Silverio Amaya MD  Physical Medicine and Rehabilitation  [address] [address]  Dept: [phone]  Dept Fax: [phone]        RE: Consultation for Shannon Vazquez MD        Dear Samia Slider

## (undated) NOTE — LETTER
AUTHORIZATION FOR SURGICAL OPERATION OR OTHER PROCEDURE    1.  I hereby authorize Dr. Taylor Oden, and [address] [place] [address] staff assigned to my case to perform the following operation and/or procedure at the [address] [place] [address]:    ____________________Endosee_ Patient Name:  ______________________________________________________  (please print)      Patient signature:  ___________________________________________________             Relationship to Patient:           []  Parent    Responsible person

## (undated) NOTE — LETTER
[facility] SURGERY SCHEDULING FORM   [address] [place] [place] [address]   [phone] (scheduling phone) [phone] (scheduling fax)     PATIENT INFORMATION   Last Name:      Luann Breaux Name:    Pollo Byrnes Allergies: Adhesive Tape         Completed by:    Nael Thibodeaux      Date:    10/2/2020

## (undated) NOTE — LETTER
7/9/2024      Mukesh Denise MD  Physical Medicine and Rehabilitation  [address] [place] [address], [address]  [facility] [address]  Dept: [phone]  Dept Fax: [phone]        RE: Consultation for Corrine Menjivar MD        Dear Ramya Robles MD,    Thank you very much for the opportunity to see your patient.  Attached please find a summary from your patient's recent visit.     I appreciate the chance to take care of your patient with you.  Please feel free to call me with any questions or concerns.    Sincerely,        Mukesh Denise MD  Electronically Signed on 7/9/2024

## (undated) NOTE — LETTER
AUTHORIZATION FOR SURGICAL OPERATION OR OTHER PROCEDURE    1. I hereby authorize Dr. Gracie Garcia and the [facility] staff assigned to my case to perform the following operation and/or procedure at the [facility]:    right upper trapezius trigger point injection    2. My physician has explained the nature and purpose of the operation or other procedure, possible alternative methods of treatment, the risks involved, and the possibility of complication to me. I acknowledge that no guarantee has been made as to the result that may be obtained. 3.  I recognize that, during the course of this operation, or other procedure, unforseen conditions may necessitate additional or different procedure than those listed above. I, therefore, further authorize and request that the above named physician, his/her physician assistants or designees perform such procedures as are, in his/her professional opinion, necessary and desirable. 4.  Any tissue or organs removed in the operation or other procedure may be disposed of by and at the discretion of the [facility] staff and [facility]. 5.  I understand that in the event of a medical emergency, I will be transported by local paramedics to [facility] or other hospital emergency department. 6.  I certify that I have read and fully understand the above consent to operation and/or other procedure. 7.  I acknowledge that my physician has explained sedation/analgesia administration to me including the risks and benefits. I consent to the administration of sedation/analgesia as may be necessary or desirable in the judgement of my physician. Witness signature: ___________________________________________________ Date:  ______/______/_____                    Time:  ________ A. M.  P.M.        Patient Name:  Nidia Bella MD  7/7/1965  [id number]         Patient signature: ___________________________________________________                       Statement of Physician  My signature below affirms that prior to the time of the procedure, I have explained to the patient and/or his/her guardian, the risks and benefits involved in the proposed treatment and any reasonable alternative to the proposed treatment. I have also explained the risks and benefits involved in the refusal of the proposed treatment and have answered the patient's questions.                         Date:  ______/______/_______  Provider                      Signature:  __________________________________________________________       Time:  ___________ ADALY ROMERO

## (undated) NOTE — LETTER
[facility] SURGERY SCHEDULING FORM   [address] [facility]   [phone] (scheduling phone) [phone] (scheduling fax)     PATIENT INFORMATION   Last Name:      Vince Moon Name:    Shannon Norris *PT WILL NEED RAPID COVID TEST PRIOR TO PROCEDURE AS SHE IS UNABLE TO QUARANTINE*     Completed by:     Gardenia RODRIGUEZ      Date:    6/16/2020

## (undated) NOTE — LETTER
Date: 2023      Patient Name: Mackenzie MD Kadie      : 1965        Thank you for choosing [facility] as your health care provider. Your physician has deemed the following medical service(s) necessary. However, your insurance plan may not pay for all of your health care and costs and may deny payment for this service. The fact that your insurance plan does not pay for an item or service does not mean you should not receive it. The purpose of this form is to help you make an informed decision about whether or not you want to receive this service(s) that may not be paid for by your insurance plan. CPT Code Description     Cost     _________ right upper trapezius trigger point injection _____________      _________ ______________________________ _____________      _________ ______________________________ _____________      I understand that the above mentioned service(s) or supply may not be covered by my insurance company.  I agree to be financially responsible for the cost of this service or supply in the event of my insurance denies payment as a non-covered benefit.        ______________________________________________________________________  Signature of Patient or Patient's Representative  Relationship  Date    ______________________________________________________________________  Signature of Witness to signing of form   Printed Name

## (undated) NOTE — LETTER
3/17/2021      Silverio Amaya MD  Physical Medicine and Rehabilitation  [address] [address]  Dept: [phone]  Dept Fax: [phone]        RE: Consultation for Shannon Vazquez MD        Dear Samia Slider

## (undated) NOTE — MR AVS SNAPSHOT
[address]  [phone]               Thank you for choosing us for your health care visit with Rosalinda Keys MD.  We are glad to serve you and happy to provide you with this summar TAKE 1 BY MOUTH NIGHTLY   Commonly known as:  ATIVAN           Metoprolol Succinate  MG Tb24   Take 1 tablet (100 mg total) by mouth daily. Commonly known as:   Toprol XL           Norethindrone-Eth Estradiol 1-5 MG-MCG Tabs   Take 1 tablet by mouth

## (undated) NOTE — ED AVS SNAPSHOT
Sophia Perkins MD   MRN: [id number]    Department:  [facility] Emergency Department   Date of Visit:  2/22/2020           Disclosure     Insurance plans vary and the physician(s) referred by the ER may not be covered by your plan.  Please cont CARE PHYSICIAN AT ONCE OR RETURN IMMEDIATELY TO THE EMERGENCY DEPARTMENT. If you have been prescribed any medication(s), please fill your prescription right away and begin taking the medication(s) as directed.   If you believe that any of the medications

## (undated) NOTE — LETTER
2018              Alejandra Valera MD        [address]         Dear Ángel Troncoso,    Here is the physical therapy referral:    Patient Name:   Alejandra Valera MD, ([id number])   Sex: female  : 1965        Order Date:  1